# Patient Record
Sex: FEMALE | Race: WHITE | NOT HISPANIC OR LATINO | Employment: UNEMPLOYED | URBAN - METROPOLITAN AREA
[De-identification: names, ages, dates, MRNs, and addresses within clinical notes are randomized per-mention and may not be internally consistent; named-entity substitution may affect disease eponyms.]

---

## 2017-04-28 DIAGNOSIS — I51.7 CARDIOMEGALY: ICD-10-CM

## 2017-04-28 DIAGNOSIS — R42 DIZZINESS AND GIDDINESS: ICD-10-CM

## 2017-05-03 ENCOUNTER — APPOINTMENT (OUTPATIENT)
Dept: LAB | Facility: CLINIC | Age: 47
End: 2017-05-03
Payer: COMMERCIAL

## 2017-05-03 ENCOUNTER — HOSPITAL ENCOUNTER (OUTPATIENT)
Dept: NON INVASIVE DIAGNOSTICS | Facility: CLINIC | Age: 47
Discharge: HOME | End: 2017-05-03
Payer: COMMERCIAL

## 2017-05-03 DIAGNOSIS — R42 DIZZINESS AND GIDDINESS: ICD-10-CM

## 2017-05-03 DIAGNOSIS — I51.7 CARDIOMEGALY: ICD-10-CM

## 2017-05-03 LAB
ALBUMIN SERPL BCP-MCNC: 3.4 G/DL (ref 3.5–5)
ALP SERPL-CCNC: 53 U/L (ref 46–116)
ALT SERPL W P-5'-P-CCNC: 31 U/L (ref 12–78)
ANION GAP SERPL CALCULATED.3IONS-SCNC: 10 MMOL/L (ref 4–13)
AST SERPL W P-5'-P-CCNC: 26 U/L (ref 5–45)
BASOPHILS # BLD AUTO: 0.06 THOUSANDS/ΜL (ref 0–0.1)
BASOPHILS NFR BLD AUTO: 1 % (ref 0–1)
BILIRUB SERPL-MCNC: 0.4 MG/DL (ref 0.2–1)
BUN SERPL-MCNC: 23 MG/DL (ref 5–25)
CALCIUM SERPL-MCNC: 8.5 MG/DL (ref 8.3–10.1)
CHLORIDE SERPL-SCNC: 108 MMOL/L (ref 100–108)
CO2 SERPL-SCNC: 24 MMOL/L (ref 21–32)
CREAT SERPL-MCNC: 0.92 MG/DL (ref 0.6–1.3)
EOSINOPHIL # BLD AUTO: 0.17 THOUSAND/ΜL (ref 0–0.61)
EOSINOPHIL NFR BLD AUTO: 3 % (ref 0–6)
ERYTHROCYTE [DISTWIDTH] IN BLOOD BY AUTOMATED COUNT: 13.2 % (ref 11.6–15.1)
GFR SERPL CREATININE-BSD FRML MDRD: >60 ML/MIN/1.73SQ M
GLUCOSE P FAST SERPL-MCNC: 75 MG/DL (ref 65–99)
HCT VFR BLD AUTO: 40.9 % (ref 34.8–46.1)
HGB BLD-MCNC: 13.5 G/DL (ref 11.5–15.4)
LYMPHOCYTES # BLD AUTO: 2.01 THOUSANDS/ΜL (ref 0.6–4.47)
LYMPHOCYTES NFR BLD AUTO: 32 % (ref 14–44)
MCH RBC QN AUTO: 30.8 PG (ref 26.8–34.3)
MCHC RBC AUTO-ENTMCNC: 33 G/DL (ref 31.4–37.4)
MCV RBC AUTO: 93 FL (ref 82–98)
MONOCYTES # BLD AUTO: 0.49 THOUSAND/ΜL (ref 0.17–1.22)
MONOCYTES NFR BLD AUTO: 8 % (ref 4–12)
NEUTROPHILS # BLD AUTO: 3.62 THOUSANDS/ΜL (ref 1.85–7.62)
NEUTS SEG NFR BLD AUTO: 56 % (ref 43–75)
PLATELET # BLD AUTO: 189 THOUSANDS/UL (ref 149–390)
PMV BLD AUTO: 12.5 FL (ref 8.9–12.7)
POTASSIUM SERPL-SCNC: 4.2 MMOL/L (ref 3.5–5.3)
PROT SERPL-MCNC: 6.7 G/DL (ref 6.4–8.2)
RBC # BLD AUTO: 4.39 MILLION/UL (ref 3.81–5.12)
SODIUM SERPL-SCNC: 142 MMOL/L (ref 136–145)
T3 SERPL-MCNC: 0.8 NG/ML (ref 0.6–1.8)
T4 FREE SERPL-MCNC: 1.02 NG/DL (ref 0.76–1.46)
TSH SERPL DL<=0.05 MIU/L-ACNC: 4.68 UIU/ML (ref 0.36–3.74)
WBC # BLD AUTO: 6.35 THOUSAND/UL (ref 4.31–10.16)

## 2017-05-03 PROCEDURE — 84443 ASSAY THYROID STIM HORMONE: CPT

## 2017-05-03 PROCEDURE — 85025 COMPLETE CBC W/AUTO DIFF WBC: CPT

## 2017-05-03 PROCEDURE — 84439 ASSAY OF FREE THYROXINE: CPT

## 2017-05-03 PROCEDURE — 80053 COMPREHEN METABOLIC PANEL: CPT

## 2017-05-03 PROCEDURE — 93306 TTE W/DOPPLER COMPLETE: CPT

## 2017-05-03 PROCEDURE — 84480 ASSAY TRIIODOTHYRONINE (T3): CPT

## 2017-05-03 PROCEDURE — 36415 COLL VENOUS BLD VENIPUNCTURE: CPT

## 2017-07-28 ENCOUNTER — TRANSCRIBE ORDERS (OUTPATIENT)
Dept: ADMINISTRATIVE | Facility: HOSPITAL | Age: 47
End: 2017-07-28

## 2017-07-28 DIAGNOSIS — R09.89 OTHER DYSPNEA AND RESPIRATORY ABNORMALITY: Primary | ICD-10-CM

## 2017-07-28 DIAGNOSIS — R55 SYNCOPE AND COLLAPSE: ICD-10-CM

## 2017-07-28 DIAGNOSIS — M79.10 MYALGIA: ICD-10-CM

## 2017-07-28 DIAGNOSIS — R89.4 ABNORMAL IMMUNOLOGICAL FINDINGS IN SPECIMENS FROM OTHER ORGANS, SYSTEMS AND TISSUES: ICD-10-CM

## 2017-07-28 DIAGNOSIS — R06.02 SHORTNESS OF BREATH: ICD-10-CM

## 2017-07-28 DIAGNOSIS — R79.89 OTHER SPECIFIED ABNORMAL FINDINGS OF BLOOD CHEMISTRY: ICD-10-CM

## 2017-07-28 DIAGNOSIS — R06.09 OTHER FORMS OF DYSPNEA: ICD-10-CM

## 2017-07-28 DIAGNOSIS — R42 DIZZINESS AND GIDDINESS: ICD-10-CM

## 2017-07-28 DIAGNOSIS — R53.83 OTHER FATIGUE: ICD-10-CM

## 2017-07-28 DIAGNOSIS — R06.09 OTHER DYSPNEA AND RESPIRATORY ABNORMALITY: Primary | ICD-10-CM

## 2017-07-31 ENCOUNTER — TRANSCRIBE ORDERS (OUTPATIENT)
Dept: LAB | Facility: CLINIC | Age: 47
End: 2017-07-31

## 2017-07-31 DIAGNOSIS — R53.83 FATIGUE, UNSPECIFIED TYPE: Primary | ICD-10-CM

## 2017-08-01 ENCOUNTER — GENERIC CONVERSION - ENCOUNTER (OUTPATIENT)
Dept: OTHER | Facility: OTHER | Age: 47
End: 2017-08-01

## 2017-08-01 ENCOUNTER — APPOINTMENT (OUTPATIENT)
Dept: LAB | Facility: CLINIC | Age: 47
End: 2017-08-01
Payer: COMMERCIAL

## 2017-08-01 ENCOUNTER — HOSPITAL ENCOUNTER (OUTPATIENT)
Dept: NON INVASIVE DIAGNOSTICS | Facility: CLINIC | Age: 47
Discharge: HOME | End: 2017-08-01
Attending: INTERNAL MEDICINE
Payer: COMMERCIAL

## 2017-08-01 ENCOUNTER — ALLSCRIPTS OFFICE VISIT (OUTPATIENT)
Dept: OTHER | Facility: OTHER | Age: 47
End: 2017-08-01

## 2017-08-01 DIAGNOSIS — M79.10 MYALGIA: ICD-10-CM

## 2017-08-01 DIAGNOSIS — R53.83 OTHER FATIGUE: ICD-10-CM

## 2017-08-01 DIAGNOSIS — R06.09 OTHER FORMS OF DYSPNEA: ICD-10-CM

## 2017-08-01 DIAGNOSIS — R53.83 FATIGUE, UNSPECIFIED TYPE: ICD-10-CM

## 2017-08-01 LAB
25(OH)D3 SERPL-MCNC: 22.9 NG/ML (ref 30–100)
ALBUMIN SERPL BCP-MCNC: 3.5 G/DL (ref 3.5–5)
ALP SERPL-CCNC: 65 U/L (ref 46–116)
ALT SERPL W P-5'-P-CCNC: 22 U/L (ref 12–78)
ANION GAP SERPL CALCULATED.3IONS-SCNC: 7 MMOL/L (ref 4–13)
AST SERPL W P-5'-P-CCNC: 20 U/L (ref 5–45)
BACTERIA UR QL AUTO: ABNORMAL /HPF
BASOPHILS # BLD AUTO: 0.03 THOUSANDS/ΜL (ref 0–0.1)
BASOPHILS NFR BLD AUTO: 1 % (ref 0–1)
BILIRUB SERPL-MCNC: 0.5 MG/DL (ref 0.2–1)
BILIRUB UR QL STRIP: NEGATIVE
BUN SERPL-MCNC: 13 MG/DL (ref 5–25)
CALCIUM SERPL-MCNC: 8.9 MG/DL (ref 8.3–10.1)
CHEST PAIN STATEMENT: NORMAL
CHLORIDE SERPL-SCNC: 103 MMOL/L (ref 100–108)
CLARITY UR: CLEAR
CO2 SERPL-SCNC: 28 MMOL/L (ref 21–32)
COLOR UR: YELLOW
CREAT SERPL-MCNC: 0.86 MG/DL (ref 0.6–1.3)
CRP SERPL QL: <3 MG/L
EOSINOPHIL # BLD AUTO: 0.1 THOUSAND/ΜL (ref 0–0.61)
EOSINOPHIL NFR BLD AUTO: 2 % (ref 0–6)
ERYTHROCYTE [DISTWIDTH] IN BLOOD BY AUTOMATED COUNT: 13.7 % (ref 11.6–15.1)
ERYTHROCYTE [SEDIMENTATION RATE] IN BLOOD: 5 MM/HOUR (ref 0–20)
GFR SERPL CREATININE-BSD FRML MDRD: 81 ML/MIN/1.73SQ M
GLUCOSE SERPL-MCNC: 83 MG/DL (ref 65–140)
GLUCOSE UR STRIP-MCNC: NEGATIVE MG/DL
HCT VFR BLD AUTO: 41.8 % (ref 34.8–46.1)
HGB BLD-MCNC: 13.6 G/DL (ref 11.5–15.4)
HGB UR QL STRIP.AUTO: NEGATIVE
KETONES UR STRIP-MCNC: NEGATIVE MG/DL
LEUKOCYTE ESTERASE UR QL STRIP: ABNORMAL
LYMPHOCYTES # BLD AUTO: 1.59 THOUSANDS/ΜL (ref 0.6–4.47)
LYMPHOCYTES NFR BLD AUTO: 28 % (ref 14–44)
MAGNESIUM SERPL-MCNC: 1.9 MG/DL (ref 1.6–2.6)
MAX DIASTOLIC BP: 80 MMHG
MAX HEART RATE: 171 BPM
MAX PREDICTED HEART RATE: 173 BPM
MAX. SYSTOLIC BP: 164 MMHG
MCH RBC QN AUTO: 30.4 PG (ref 26.8–34.3)
MCHC RBC AUTO-ENTMCNC: 32.5 G/DL (ref 31.4–37.4)
MCV RBC AUTO: 94 FL (ref 82–98)
MONOCYTES # BLD AUTO: 0.47 THOUSAND/ΜL (ref 0.17–1.22)
MONOCYTES NFR BLD AUTO: 8 % (ref 4–12)
NEUTROPHILS # BLD AUTO: 3.58 THOUSANDS/ΜL (ref 1.85–7.62)
NEUTS SEG NFR BLD AUTO: 61 % (ref 43–75)
NITRITE UR QL STRIP: NEGATIVE
NON-SQ EPI CELLS URNS QL MICRO: ABNORMAL /HPF
PH UR STRIP.AUTO: 7.5 [PH] (ref 4.5–8)
PLATELET # BLD AUTO: 211 THOUSANDS/UL (ref 149–390)
PMV BLD AUTO: 11.8 FL (ref 8.9–12.7)
POTASSIUM SERPL-SCNC: 4.4 MMOL/L (ref 3.5–5.3)
PROT SERPL-MCNC: 6.8 G/DL (ref 6.4–8.2)
PROT UR STRIP-MCNC: NEGATIVE MG/DL
PROTOCOL NAME: NORMAL
RBC # BLD AUTO: 4.47 MILLION/UL (ref 3.81–5.12)
RBC #/AREA URNS AUTO: ABNORMAL /HPF
REASON FOR TERMINATION: NORMAL
SODIUM SERPL-SCNC: 138 MMOL/L (ref 136–145)
SP GR UR STRIP.AUTO: <=1.005 (ref 1–1.03)
T4 FREE SERPL-MCNC: 1.03 NG/DL (ref 0.76–1.46)
TARGET HR FORMULA: NORMAL
TEST INDICATION: NORMAL
TIME IN EXERCISE PHASE: 1140 S
TSH SERPL DL<=0.05 MIU/L-ACNC: 3.03 UIU/ML (ref 0.36–3.74)
UROBILINOGEN UR QL STRIP.AUTO: 0.2 E.U./DL
WBC # BLD AUTO: 5.77 THOUSAND/UL (ref 4.31–10.16)
WBC #/AREA URNS AUTO: ABNORMAL /HPF

## 2017-08-01 PROCEDURE — 84439 ASSAY OF FREE THYROXINE: CPT

## 2017-08-01 PROCEDURE — 80053 COMPREHEN METABOLIC PANEL: CPT

## 2017-08-01 PROCEDURE — 86140 C-REACTIVE PROTEIN: CPT

## 2017-08-01 PROCEDURE — 86618 LYME DISEASE ANTIBODY: CPT

## 2017-08-01 PROCEDURE — 83735 ASSAY OF MAGNESIUM: CPT

## 2017-08-01 PROCEDURE — 86664 EPSTEIN-BARR NUCLEAR ANTIGEN: CPT

## 2017-08-01 PROCEDURE — 82306 VITAMIN D 25 HYDROXY: CPT

## 2017-08-01 PROCEDURE — 86663 EPSTEIN-BARR ANTIBODY: CPT

## 2017-08-01 PROCEDURE — 36415 COLL VENOUS BLD VENIPUNCTURE: CPT

## 2017-08-01 PROCEDURE — 85025 COMPLETE CBC W/AUTO DIFF WBC: CPT

## 2017-08-01 PROCEDURE — 84443 ASSAY THYROID STIM HORMONE: CPT

## 2017-08-01 PROCEDURE — 85652 RBC SED RATE AUTOMATED: CPT

## 2017-08-01 PROCEDURE — 86665 EPSTEIN-BARR CAPSID VCA: CPT

## 2017-08-01 PROCEDURE — 93017 CV STRESS TEST TRACING ONLY: CPT

## 2017-08-01 PROCEDURE — 81001 URINALYSIS AUTO W/SCOPE: CPT

## 2017-08-02 LAB
B BURGDOR IGG SER IA-ACNC: 0.09
B BURGDOR IGM SER IA-ACNC: 0.17

## 2017-08-04 LAB
EBV EA IGG SER-ACNC: 21.2 U/ML (ref 0–8.9)
EBV NA IGG SER IA-ACNC: 146 U/ML (ref 0–17.9)
EBV PATRN SPEC IB-IMP: ABNORMAL
EBV VCA IGG SER IA-ACNC: 228 U/ML (ref 0–17.9)
EBV VCA IGM SER IA-ACNC: >160 U/ML (ref 0–35.9)

## 2017-08-08 ENCOUNTER — HOSPITAL ENCOUNTER (OUTPATIENT)
Dept: RADIOLOGY | Facility: HOSPITAL | Age: 47
Discharge: HOME | End: 2017-08-08
Payer: COMMERCIAL

## 2017-08-08 ENCOUNTER — TRANSCRIBE ORDERS (OUTPATIENT)
Dept: ADMINISTRATIVE | Facility: HOSPITAL | Age: 47
End: 2017-08-08

## 2017-08-08 ENCOUNTER — GENERIC CONVERSION - ENCOUNTER (OUTPATIENT)
Dept: OTHER | Facility: OTHER | Age: 47
End: 2017-08-08

## 2017-08-08 ENCOUNTER — APPOINTMENT (OUTPATIENT)
Dept: LAB | Facility: CLINIC | Age: 47
End: 2017-08-08
Payer: COMMERCIAL

## 2017-08-08 DIAGNOSIS — R89.4 ABNORMAL IMMUNOLOGICAL FINDINGS IN SPECIMENS FROM OTHER ORGANS, SYSTEMS AND TISSUES: ICD-10-CM

## 2017-08-08 DIAGNOSIS — M79.10 MYALGIA: ICD-10-CM

## 2017-08-08 DIAGNOSIS — R06.02 SHORTNESS OF BREATH: ICD-10-CM

## 2017-08-08 DIAGNOSIS — R53.83 OTHER FATIGUE: ICD-10-CM

## 2017-08-08 LAB — DEPRECATED D DIMER PPP: 8958 NG/ML (FEU) (ref 0–424)

## 2017-08-08 PROCEDURE — 71020 HB CHEST X-RAY 2VW FRONTAL&LATL: CPT

## 2017-08-08 PROCEDURE — 85379 FIBRIN DEGRADATION QUANT: CPT

## 2017-08-08 PROCEDURE — 86747 PARVOVIRUS ANTIBODY: CPT

## 2017-08-08 PROCEDURE — 36415 COLL VENOUS BLD VENIPUNCTURE: CPT

## 2017-08-08 PROCEDURE — 86665 EPSTEIN-BARR CAPSID VCA: CPT

## 2017-08-08 PROCEDURE — 86664 EPSTEIN-BARR NUCLEAR ANTIGEN: CPT

## 2017-08-08 PROCEDURE — 86644 CMV ANTIBODY: CPT

## 2017-08-08 PROCEDURE — 86645 CMV ANTIBODY IGM: CPT

## 2017-08-08 PROCEDURE — 86663 EPSTEIN-BARR ANTIBODY: CPT

## 2017-08-09 ENCOUNTER — HOSPITAL ENCOUNTER (OUTPATIENT)
Dept: RADIOLOGY | Facility: HOSPITAL | Age: 47
Discharge: HOME | End: 2017-08-09
Payer: COMMERCIAL

## 2017-08-09 ENCOUNTER — HOSPITAL ENCOUNTER (OUTPATIENT)
Dept: RADIOLOGY | Facility: HOSPITAL | Age: 47
End: 2017-08-09
Payer: COMMERCIAL

## 2017-08-09 ENCOUNTER — TRANSCRIBE ORDERS (OUTPATIENT)
Dept: ADMINISTRATIVE | Facility: HOSPITAL | Age: 47
End: 2017-08-09

## 2017-08-09 DIAGNOSIS — R79.89 OTHER ABNORMAL BLOOD CHEMISTRY: Primary | ICD-10-CM

## 2017-08-09 DIAGNOSIS — R06.02 SHORTNESS OF BREATH: ICD-10-CM

## 2017-08-09 DIAGNOSIS — R79.89 OTHER ABNORMAL BLOOD CHEMISTRY: ICD-10-CM

## 2017-08-09 LAB
B19V IGG SER IA-ACNC: 5.1 INDEX (ref 0–0.8)
B19V IGM SER IA-ACNC: 0.1 INDEX (ref 0–0.8)
CMV IGG SERPL IA-ACNC: <0.6 U/ML (ref 0–0.59)
CMV IGM SERPL IA-ACNC: <30 AU/ML (ref 0–29.9)
EBV EA IGG SER-ACNC: 27.6 U/ML (ref 0–8.9)
EBV NA IGG SER IA-ACNC: 143 U/ML (ref 0–17.9)
EBV PATRN SPEC IB-IMP: ABNORMAL
EBV VCA IGG SER IA-ACNC: 223 U/ML (ref 0–17.9)
EBV VCA IGM SER IA-ACNC: >160 U/ML (ref 0–35.9)

## 2017-08-09 PROCEDURE — 71275 CT ANGIOGRAPHY CHEST: CPT

## 2017-08-09 PROCEDURE — 93970 EXTREMITY STUDY: CPT

## 2017-08-09 RX ADMIN — IOHEXOL 85 ML: 350 INJECTION, SOLUTION INTRAVENOUS at 13:20

## 2017-08-15 ENCOUNTER — ALLSCRIPTS OFFICE VISIT (OUTPATIENT)
Dept: OTHER | Facility: OTHER | Age: 47
End: 2017-08-15

## 2017-11-28 DIAGNOSIS — R53.83 OTHER FATIGUE: ICD-10-CM

## 2017-11-28 DIAGNOSIS — R89.4 ABNORMAL IMMUNOLOGICAL FINDINGS IN SPECIMENS FROM OTHER ORGANS, SYSTEMS AND TISSUES: ICD-10-CM

## 2017-11-28 DIAGNOSIS — R79.89 OTHER SPECIFIED ABNORMAL FINDINGS OF BLOOD CHEMISTRY: ICD-10-CM

## 2017-12-18 ENCOUNTER — ALLSCRIPTS OFFICE VISIT (OUTPATIENT)
Dept: OTHER | Facility: OTHER | Age: 47
End: 2017-12-18

## 2018-01-11 NOTE — MISCELLANEOUS
Message  I spoke with patient about her lab work  She has positive Shaheen-Lizarraga titers  I did speak with Dr Alejandra Calhoun of Infectious Disease who feels this could possibly be cross reactive antibodies to multiple viral illnesses including CMV  This could also be positive in cases when a patient has early rheumatoid arthritis and we could consider CMV antibodies, RF and ZACH as the next set of labs  I spoke with the patient extensively last night reviewing all of her lab work  She does remain fatigued and somewhat short of breath  She is still able to run 6-7 miles at a time but feels more short of breath when she is resting or occasionally in the middle the night  I am going to have her get a chest x-ray at her request       Plan  Shortness of breath    · * XR CHEST PA & LATERAL; Status:Active;  Requested for:[id number];     Signatures   Electronically signed by : CHRISTA Oro ; Aug  8 2017 10:57AM EST                       (Author)

## 2018-01-11 NOTE — MISCELLANEOUS
Message  I received a call from the patient's , Dr Adan Speaker, regarding this patient not feeling well  She has been having some persistent fatigue and arthralgias  She will be establishing as a patient in my practice, so I agreed to order some initial lab work on her  We will set het up for a visit in our practice  Plan  Fatigue    · (1) CBC/PLT/DIFF; Status:Active; Requested for:01Aug2017;    · (1) COMPREHENSIVE METABOLIC PANEL; Status:Active; Requested for:01Aug2017;    · (1) C-REACTIVE PROTEIN; Status:Active; Requested for:01Aug2017;    · (1) DELORES BARR VIRUS; Status:Active; Requested for:01Aug2017;    · (1) LYME ANTIBODY PROFILE W/REFLEX TO WESTERN BLOT; Status:Active; Requested for:01Aug2017;    · (1) MAGNESIUM; Status:Active; Requested for:01Aug2017;    · (1) SED RATE; Status:Active; Requested for:01Aug2017;    · (1) T4, FREE; Status:Active; Requested for:01Aug2017;    · (1) TSH; Status:Active; Requested for:01Aug2017;    · (1) URINALYSIS (will reflex a microscopy if leukocytes, occult blood, protein or nitrites  are not within normal limits); Status:Active; Requested for:01Aug2017;   Myalgia    · (1) VITAMIN D 25-HYDROXY; Status:Active;  Requested for:01Aug2017;     Signatures   Electronically signed by : CHRITSA Marcano ; Aug  1 2017  9:40AM EST                       (Author)

## 2018-01-14 VITALS
DIASTOLIC BLOOD PRESSURE: 84 MMHG | SYSTOLIC BLOOD PRESSURE: 106 MMHG | BODY MASS INDEX: 19.3 KG/M2 | HEART RATE: 95 BPM | WEIGHT: 123 LBS | HEIGHT: 67 IN

## 2018-01-14 VITALS — SYSTOLIC BLOOD PRESSURE: 90 MMHG | DIASTOLIC BLOOD PRESSURE: 76 MMHG

## 2018-01-16 NOTE — RESULT NOTES
Verified Results  (1) D-DIMER 08Aug2017 04:03PM Anil Ritter Order Number: [id number]     Test Name Result Flag Reference   D-DIMER 8958 ng/ml (FEU) H 0-424   Reference and upper limits to exclude DVT and PE are the same  Do not use to exclude if clinical symptoms are present  Pregnant women:  1st trimester:  <220 - 1060 ng/ml (FEU)  2nd trimester:  <220 - 1880 ng/ml (FEU)  3rd trimester:   238 - 3280 ng/ml (FEU)     * XR CHEST PA & LATERAL 08Aug2017 03:40PM Raadmary anneanthony Pollyakhil Rdz Order Number: [id number]     Test Name Result Flag Reference   XR CHEST PA & LATERAL (Report)     CHEST      INDICATION: Shortness of breath  COMPARISON: None     VIEWS: Frontal and lateral projections     IMAGES: 2     FINDINGS:        Cardiomediastinal silhouette appears unremarkable  The lungs are clear  No pneumothorax or pleural effusion  Visualized osseous structures appear within normal limits for the patient's age  IMPRESSION:     No active pulmonary disease  Workstation performed: [id number]     Signed by:   Phu Novak MD   8/8/17     (1) LYME ANTIBODY PROFILE W/REFLEX TO WESTERN BLOT 01Aug2017 11:22AM Eder Rdz Order Number: [id number]     Test Name Result Flag Reference   LYME IGG 0 09  0 00-0 79   NEGATIVE(0 00-0 79)-Absence of detectable Borrelia IgG Antibodies  A negative result does not exclude the possibility of Borrelia infection  If early Lyme disease is suspected,a second sample should be collected & tested 4 weeks after initial testing  LYME IGM 0 17  0 00-0 79   NEGATIVE (0 00-0 79)-Absence of detectable Borrelia IgM antibodies  A negative result does not exclude the possibility of Borrelia infection   If early lyme disease is suspected, a second sample should be collected & tested 4 weeks after initial testing      (1) CBC/PLT/DIFF 01Aug2017 11:22AM Sidonie Reading     Test Name Result Flag Reference   WBC COUNT 5 77 Thousand/uL 4 31-10 16   RBC COUNT 4 47 Million/uL  3 81-5 12   HEMOGLOBIN 13 6 g/dL  11 5-15 4   HEMATOCRIT 41 8 %  34 8-46  1   MCV 94 fL  82-98   MCH 30 4 pg  26 8-34 3   MCHC 32 5 g/dL  31 4-37 4   RDW 13 7 %  11 6-15 1   MPV 11 8 fL  8 9-12 7   PLATELET COUNT 916 Thousands/uL  149-390   NEUTROPHILS RELATIVE PERCENT 61 %  43-75   LYMPHOCYTES RELATIVE PERCENT 28 %  14-44   MONOCYTES RELATIVE PERCENT 8 %  4-12   EOSINOPHILS RELATIVE PERCENT 2 %  0-6   BASOPHILS RELATIVE PERCENT 1 %  0-1   NEUTROPHILS ABSOLUTE COUNT 3 58 Thousands/? ??L  1 85-7 62   LYMPHOCYTES ABSOLUTE COUNT 1 59 Thousands/? ??L  0 60-4 47   MONOCYTES ABSOLUTE COUNT 0 47 Thousand/? ??L  0 17-1 22   EOSINOPHILS ABSOLUTE COUNT 0 10 Thousand/? ??L  0 00-0 61   BASOPHILS ABSOLUTE COUNT 0 03 Thousands/? ??L  0 00-0 10     (1) COMPREHENSIVE METABOLIC PANEL [id number] 60:51NS [place] Jess Almanzar     Test Name Result Flag Reference   GLUCOSE,RANDM 83 mg/dL     If the patient is fasting, the ADA then defines impaired fasting glucose as > 100 mg/dL and diabetes as > or equal to 123 mg/dL  SODIUM 138 mmol/L  136-145   POTASSIUM 4 4 mmol/L  3 5-5 3   CHLORIDE 103 mmol/L  100-108   CARBON DIOXIDE 28 mmol/L  21-32   ANION GAP (CALC) 7 mmol/L  4-13   BLOOD UREA NITROGEN 13 mg/dL  5-25   CREATININE 0 86 mg/dL  0 60-1 30   Standardized to IDMS reference method   CALCIUM 8 9 mg/dL  8 3-10 1   BILI, TOTAL 0 50 mg/dL  0 20-1 00   ALK PHOSPHATAS 65 U/L     ALT (SGPT) 22 U/L  12-78   AST(SGOT) 20 U/L  5-45   ALBUMIN 3 5 g/dL  3 5-5 0   TOTAL PROTEIN 6 8 g/dL  6 4-8 2   eGFR 81 ml/min/1 73sq m     National Kidney Disease Education Program recommendations are as follows:  GFR calculation is accurate only with a steady state creatinine  Chronic Kidney disease less than 60 ml/min/1 73 sq  meters  Kidney failure less than 15 ml/min/1 73 sq  meters       (1) TSH [id number] 11:22AM Stephani Edwardo     Test Name Result Flag Reference   TSH 3 028 uIU/mL  0 358-3 740   Patients undergoing fluorescein dye angiography may retain small amounts of fluorescein in the body for 48-72 hours post procedure  Samples containing fluorescein can produce falsely depressed TSH values  If the patient had this procedure,a specimen should be resubmitted post fluorescein clearance  The recommended reference ranges for TSH during pregnancy are as follows:  First trimester 0 1 to 2 5 uIU/mL  Second trimester  0 2 to 3 0 uIU/mL  Third trimester 0 3 to 3 0 uIU/m     (1) T4, FREE 01Aug2017 11:22AM Nestora Milks     Test Name Result Flag Reference   T4,FREE 1 03 ng/dL  0 76-1 46     (1) Joy Bishop VIRUS 01Aug2017 11:22AM Nestora Milks   TW Order Number: [id number]     Test Name Result Flag Reference   EBV EARLY ANTIGEN AB, IGG 21 2 U/mL H 0 0 - 8 9   Hepatitis A, Hepatitis C and HIV antibodies may cross-react  with this assay    **Verified by repeat analysis**                                   Negative        < 9 0                                   Equivocal  9 0 - 10 9                                   Positive        >10 9   DELORES-BARR VCA  0 U/mL H 0 0 - 17 9   Negative        <18 0                                   Equivocal 18 0 - 21 9                                   Positive        >21 9   DELORES-BARR VCA IGM >160 0 U/mL H 0 0 - 35 9   Negative        <36 0                                   Equivocal 36 0 - 43 9                                   Positive        >43 9   DELORES-COVARRUBIAS NUCLEAR ANTIGEN AB  0 U/mL H 0 0 - 17 9   Negative        <18 0                                   Equivocal 18 0 - 21 9                                   Positive        >21 9   EBV INTERPRETATION Comment     EBV Interpretation Chart  Interpretation   EBV-IgM  EA(D)-IgG  VCA-IgG  EBNA-IgG  EBV Seronegative    -        -         -          -  Early Phase         +        -         -          -  Acute Primary       +       +or-       +          -  Infection  Convalescence/Past  - +or-       +          +  Infection  Reactivated        +or-      +         +          +  Infection         + Antibody Present      - Antibody Absent    Performed at:  [address]  [id number]  : Alex Rossi MD, Phone:  [phone]     (1) MAGNESIUM 01Aug2017 11:22AM [place] Arvid Bence     Test Name Result Flag Reference   MAGNESIUM 1 9 mg/dL  1 6-2 6     (1) URINALYSIS (will reflex a microscopy if leukocytes, occult blood, protein or nitrites are not within normal limits) 01Aug2017 11:22AM Jessi Cox     Test Name Result Flag Reference   COLOR Yellow     CLARITY Clear     SPECIFIC GRAVITY UA <=1 005  1 003-1 030   PH UA 7 5  4 5-8 0   LEUKOCYTE ESTERASE UA Trace A Negative   NITRITE UA Negative  Negative   PROTEIN UA Negative mg/dl  Negative   GLUCOSE UA Negative mg/dl  Negative   KETONES UA Negative mg/dl  Negative   UROBILINOGEN UA 0 2 E U /dl  0 2, 1 0 E U /dl   BILIRUBIN UA Negative  Negative   BLOOD UA Negative  Negative   BACTERIA Occasional /hpf  None Seen, Occasional   EPITHELIAL CELLS Occasional /hpf  None Seen, Occasional   RBC UA 0-1 /hpf A None Seen   WBC UA 0-1 /hpf A None Seen     (1) VITAMIN D 25-HYDROXY 01Aug2017 11:22AM Jessi Cox     Test Name Result Flag Reference   VIT D 25-HYDROX 22 9 ng/mL L 30 0-100 0   This assay is a certified procedure of the CDC Vitamin D Standardization Certification Program (VDSCP)     Deficiency <20ng/ml   Insufficiency 20-30ng/ml   Sufficient  ng/ml     *Patients undergoing fluorescein dye angiography may retain small amounts of fluorescein in the body for 48-72 hours post procedure  Samples containing fluorescein can produce falsely elevated Vitamin D values  If the patient had this procedure, a specimen should be resubmitted post fluorescein clearance

## 2018-02-26 ENCOUNTER — TELEPHONE (OUTPATIENT)
Dept: FAMILY MEDICINE CLINIC | Facility: CLINIC | Age: 48
End: 2018-02-26

## 2018-02-26 NOTE — TELEPHONE ENCOUNTER
She called and LM that she never got her lab results that were drawn at [facility] in [place] at beginning of Feb    Results are not in her chart  I called [employer] and spoke w Almita Weathers who is faxing us results asap

## 2018-07-26 ENCOUNTER — HOSPITAL ENCOUNTER (OUTPATIENT)
Dept: MAMMOGRAPHY | Facility: CLINIC | Age: 48
Discharge: HOME | End: 2018-07-26
Payer: COMMERCIAL

## 2018-07-26 ENCOUNTER — HOSPITAL ENCOUNTER (OUTPATIENT)
Dept: ULTRASOUND IMAGING | Facility: CLINIC | Age: 48
Discharge: HOME | End: 2018-07-26
Payer: COMMERCIAL

## 2018-07-26 DIAGNOSIS — N63.0 LUMP OR MASS IN BREAST: ICD-10-CM

## 2018-07-26 PROCEDURE — 77066 DX MAMMO INCL CAD BI: CPT

## 2018-07-26 PROCEDURE — G0279 TOMOSYNTHESIS, MAMMO: HCPCS

## 2018-09-05 ENCOUNTER — VBI (OUTPATIENT)
Dept: ADMINISTRATIVE | Facility: OTHER | Age: 48
End: 2018-09-05

## 2018-09-11 NOTE — TELEPHONE ENCOUNTER
Claire Record    ED Visit Information     Ed visit date:8/21/18  Diagnosis Description: PALPITATIONS  In Network? No  Discharge status: Home  Discharged with meds ? NA  Number of ED visits to date: 1  ED Severity:5     Outreach Information    Outreach successful: Yes 1  Date letter mailed:0  Date Finalized:9/11/18    Care Coordination    Follow up appointment with pcp: no declined  Transportation issues ? No    [employer] type:  3 Day Outreach  Patient refsued the answer questions:  Yes  [facility] PCP:  Yes  Transportation:  Self Transport  Called PCP first?:  No  Practice Contacted Patient ?:  No  Pt had ED follow up with pcp/staff ?:  No    Reason Patient went to ED instead of Urgent Care or PCP?:  Proximity  Urgent care Education?:  No  Pt called me back stating she is doing fine feels better and doesn't feel she needs f/u   went to hospital due to proximity  Pt ended conversation no further information obtained

## 2019-03-22 ENCOUNTER — OFFICE VISIT (OUTPATIENT)
Dept: OBGYN CLINIC | Facility: CLINIC | Age: 49
End: 2019-03-22
Payer: COMMERCIAL

## 2019-03-22 VITALS
DIASTOLIC BLOOD PRESSURE: 73 MMHG | HEIGHT: 66 IN | HEART RATE: 61 BPM | BODY MASS INDEX: 19.85 KG/M2 | SYSTOLIC BLOOD PRESSURE: 110 MMHG

## 2019-03-22 DIAGNOSIS — M84.363A STRESS FRACTURE OF RIGHT FIBULA, INITIAL ENCOUNTER: Primary | ICD-10-CM

## 2019-03-22 DIAGNOSIS — D16.9 ENCHONDROMA OF BONE: ICD-10-CM

## 2019-03-22 PROCEDURE — 99203 OFFICE O/P NEW LOW 30 MIN: CPT | Performed by: ORTHOPAEDIC SURGERY

## 2019-03-22 NOTE — PROGRESS NOTES
CHRISTA Singh  Attending, Orthopaedic Surgery  [facility] [address] [facility]        ORTHOPAEDIC FOOT AND ANKLE CLINIC VISIT     Assessment:     Encounter Diagnoses   Name Primary? 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               Plan:   · The patient verbalized understanding of exam findings and treatment plan  We engaged in the shared decision-making process and treatment options were discussed at length with the patient  Surgical and conservative management discussed today along with risks and benefits  · Recommend non-weight bearing for 3 weeks in the boot  · Given crutches and recommended purchasing a knee rolling scooter  · Start taking Vitamin D and Calcium supplements OTC  · We will monitor the lesion in her 5th metatarsal with serial imaging in 6 months  Return in about 3 weeks (around 4/12/2019)  for reevaluation  If her pain is resolved at that time, we will allow her to gradually return to weight bearing and sneaker  History of Present Illness:   Chief Complaint:   Chief Complaint   Patient presents with   ASTRID/ Pito 9 is a 50 y o  female who is being seen for right ankle pain  She reports onset of pain occurred 4 weeks ago after running 6 miles on a treadmill  She is very active and typically runs 6 miles a day among other activities  Pain is localized just superior to the lateral malleolus with minimal radiating and described as sharp and severe  She feels that the pain is gradually improving  She has been weight bearing in a walking boot for the past 3 weeks  She has been avoiding running and all exercise for the past 3 weeks  Patient denies numbness, tingling or radicular pain  Denies history of neuropathy  Patient does not smoke, does not have diabetes and does not take blood thinners    Patient denies family history of anesthesia complications and has not had any complications with anesthesia  Pain/symptom timing:  Worse during the day when active  Pain/symptom context:  Worse with activites and work  Pain/symptom modifying factors:  Rest makes better, activities make worse  Pain/symptom associated signs/symptoms: none    Prior treatment   · NSAIDsNo    · Injections No   · Bracing/Orthotics Yes walking boot  · Physical Therapy No     Orthopedic Surgical History:   See below    Past Medical, Surgical and Social History:  Past Medical History:  has no past medical history on file  Problem List: does not have a problem list on file  Past Surgical History:  has no past surgical history on file  Family History: family history is not on file  Social History:  reports that she has never smoked  She has never used smokeless tobacco  Her alcohol and drug histories are not on file  Current Medications: currently has no medications in their medication list   Allergies: has No Known Allergies  Review of Systems:  General- denies fever/chills  HEENT- denies hearing loss or sore throat  Eyes- denies eye pain or visual disturbances, denies red eyes  Respiratory- denies cough or SOB  Cardio- denies chest pain or palpitations  GI- denies abdominal pain  Endocrine- denies urinary frequency  Urinary- denies pain with urination  Musculoskeletal- Negative except noted above  Skin- denies rashes or wounds  Neurological- denies dizziness or headache  Psychiatric- denies anxiety or difficulty concentrating    Physical Exam:   /73 (BP Location: Left arm, Patient Position: Sitting, Cuff Size: Adult)   Pulse 61   Ht 5' 6" (1 676 m)   BMI 19 85 kg/m²   General/Constitutional: No apparent distress: well-nourished and well developed    Eyes: normal ocular motion  Lymphatic: No appreciable lymphadenopathy  Respiratory: Non-labored breathing  Vascular: No edema, swelling or tenderness, except as noted in detailed exam   Integumentary: No impressive skin lesions present, except as noted in detailed exam   Neuro: No ataxia or tremors noted  Psych: Normal mood and affect, oriented to person, place and time  Appropriate affect  Musculoskeletal: Normal, except as noted in detailed exam and in HPI  Examination    Right    Gait Normal   Musculoskeletal Tender to palpation at distal fibula, just superior to the lateral malleolus    Skin Normal       Nails Normal    Range of Motion  25 degrees dorsiflexion, 50 degrees plantarflexion  Subtalar motion: normal    Stability Stable    Muscle Strength 5/5 tibialis anterior  5/5 gastrocnemius-soleus  5/5 posterior tibialis  5/5 peroneal/eversion strength  5/5 EHL  5/5 FHL    Neurologic Normal    Sensation Intact to light touch throughout sural, saphenous, superficial peroneal, deep peroneal and medial/lateral plantar nerve distributions  [place] 5 07 filament (10g) testing deferred  Cardiovascular Brisk capillary refill < 2 seconds,intact DP and PT pulses    Special Tests None      Imaging Studies:   MRI of the right ankle reviewed and interpreted independently that demonstrate a stress fracture with bone marrow edema of the distal fibula  Also noted on MRI is a 5th MT intramedullary bone lesion consistent with enchondroma or NOF    X-ray of the right ankle demonstrates stress reaction of the distal fibula and correlates with MRI  Scribe Attestation    I,:   Thang Chand PA-C am acting as a scribe while in the presence of the attending physician :        I,:   Dawna Estrada MD personally performed the services described in this documentation    as scribed in my presence :              Authur Distel Lachman, MD  Foot & Ankle Surgery   Department of [address] [place] [address]      I personally performed the service  Authur Distel Lachman, MD

## 2019-04-12 ENCOUNTER — OFFICE VISIT (OUTPATIENT)
Dept: OBGYN CLINIC | Facility: CLINIC | Age: 49
End: 2019-04-12
Payer: COMMERCIAL

## 2019-04-12 VITALS
HEART RATE: 72 BPM | HEIGHT: 66 IN | BODY MASS INDEX: 19.85 KG/M2 | SYSTOLIC BLOOD PRESSURE: 125 MMHG | DIASTOLIC BLOOD PRESSURE: 78 MMHG

## 2019-04-12 DIAGNOSIS — D16.9 ENCHONDROMA OF BONE: ICD-10-CM

## 2019-04-12 DIAGNOSIS — M84.363A STRESS FRACTURE OF RIGHT FIBULA, INITIAL ENCOUNTER: Primary | ICD-10-CM

## 2019-04-12 PROCEDURE — 99213 OFFICE O/P EST LOW 20 MIN: CPT | Performed by: ORTHOPAEDIC SURGERY

## 2019-05-10 ENCOUNTER — HOSPITAL ENCOUNTER (OUTPATIENT)
Dept: RADIOLOGY | Facility: HOSPITAL | Age: 49
Discharge: HOME | End: 2019-05-10
Attending: ORTHOPAEDIC SURGERY
Payer: COMMERCIAL

## 2019-05-10 ENCOUNTER — OFFICE VISIT (OUTPATIENT)
Dept: OBGYN CLINIC | Facility: HOSPITAL | Age: 49
End: 2019-05-10
Payer: COMMERCIAL

## 2019-05-10 VITALS
BODY MASS INDEX: 19.26 KG/M2 | HEIGHT: 67 IN | SYSTOLIC BLOOD PRESSURE: 121 MMHG | HEART RATE: 67 BPM | DIASTOLIC BLOOD PRESSURE: 80 MMHG

## 2019-05-10 DIAGNOSIS — D16.9 ENCHONDROMA OF BONE: ICD-10-CM

## 2019-05-10 DIAGNOSIS — M84.363S STRESS FRACTURE OF RIGHT FIBULA, SEQUELA: Primary | ICD-10-CM

## 2019-05-10 DIAGNOSIS — M84.363S STRESS FRACTURE OF RIGHT FIBULA, SEQUELA: ICD-10-CM

## 2019-05-10 PROCEDURE — 73590 X-RAY EXAM OF LOWER LEG: CPT

## 2019-05-10 PROCEDURE — 99213 OFFICE O/P EST LOW 20 MIN: CPT | Performed by: ORTHOPAEDIC SURGERY

## 2019-07-16 ENCOUNTER — TRANSCRIBE ORDERS (OUTPATIENT)
Dept: ADMINISTRATIVE | Facility: HOSPITAL | Age: 49
End: 2019-07-16

## 2019-07-16 DIAGNOSIS — Z12.31 VISIT FOR SCREENING MAMMOGRAM: Primary | ICD-10-CM

## 2019-07-16 DIAGNOSIS — Z12.39 BREAST SCREENING, UNSPECIFIED: Primary | ICD-10-CM

## 2019-07-16 DIAGNOSIS — N60.19 FIBROCYSTIC BREAST DISEASE (FCBD), UNSPECIFIED LATERALITY: ICD-10-CM

## 2019-09-12 ENCOUNTER — HOSPITAL ENCOUNTER (OUTPATIENT)
Dept: RADIOLOGY | Facility: HOSPITAL | Age: 49
Discharge: HOME | End: 2019-09-12
Payer: COMMERCIAL

## 2019-09-12 VITALS — WEIGHT: 120 LBS | HEIGHT: 66 IN | BODY MASS INDEX: 19.29 KG/M2

## 2019-09-12 DIAGNOSIS — N60.19 FIBROCYSTIC BREAST DISEASE (FCBD), UNSPECIFIED LATERALITY: ICD-10-CM

## 2019-09-12 DIAGNOSIS — Z12.39 BREAST SCREENING, UNSPECIFIED: ICD-10-CM

## 2019-09-12 PROCEDURE — 77063 BREAST TOMOSYNTHESIS BI: CPT

## 2019-09-12 PROCEDURE — 76641 ULTRASOUND BREAST COMPLETE: CPT

## 2019-09-12 PROCEDURE — 77067 SCR MAMMO BI INCL CAD: CPT

## 2019-09-12 PROCEDURE — 76377 3D RENDER W/INTRP POSTPROCES: CPT

## 2019-10-30 ENCOUNTER — OFFICE VISIT (OUTPATIENT)
Dept: FAMILY MEDICINE CLINIC | Facility: CLINIC | Age: 49
End: 2019-10-30
Payer: COMMERCIAL

## 2019-10-30 VITALS
HEIGHT: 66 IN | DIASTOLIC BLOOD PRESSURE: 64 MMHG | OXYGEN SATURATION: 99 % | BODY MASS INDEX: 19.37 KG/M2 | SYSTOLIC BLOOD PRESSURE: 108 MMHG | HEART RATE: 64 BPM | RESPIRATION RATE: 16 BRPM

## 2019-10-30 DIAGNOSIS — N92.0 MENORRHAGIA WITH REGULAR CYCLE: ICD-10-CM

## 2019-10-30 DIAGNOSIS — Z13.220 SCREENING FOR LIPID DISORDERS: ICD-10-CM

## 2019-10-30 DIAGNOSIS — Z00.00 ANNUAL PHYSICAL EXAM: Primary | ICD-10-CM

## 2019-10-30 DIAGNOSIS — Z12.11 COLON CANCER SCREENING: ICD-10-CM

## 2019-10-30 DIAGNOSIS — M84.363S STRESS FRACTURE OF RIGHT FIBULA, SEQUELA: ICD-10-CM

## 2019-10-30 DIAGNOSIS — Z23 FLU VACCINE NEED: ICD-10-CM

## 2019-10-30 PROBLEM — R76.8 EPSTEIN-BARR VIRUS SEROPOSITIVITY: Status: ACTIVE | Noted: 2017-08-08

## 2019-10-30 PROBLEM — R79.89 POSITIVE D-DIMER: Status: ACTIVE | Noted: 2017-08-09

## 2019-10-30 PROCEDURE — 99396 PREV VISIT EST AGE 40-64: CPT | Performed by: FAMILY MEDICINE

## 2019-10-30 PROCEDURE — 90471 IMMUNIZATION ADMIN: CPT

## 2019-10-30 PROCEDURE — 90686 IIV4 VACC NO PRSV 0.5 ML IM: CPT

## 2019-10-30 NOTE — PROGRESS NOTES
FAMILY PRACTICE OFFICE VISIT       NAME: Chilango Dawson  AGE: 52 y o  SEX: female       : 1970        MRN: [id number]    DATE: 2019  TIME: 3:33 PM    Assessment and Plan   BMI Counseling: Body mass index is 19 37 kg/m²  The BMI is above normal  Nutrition recommendations include encouraging healthy choices of fruits and vegetables  Exercise recommendations include moderate physical activity 150 minutes/week and exercising 3-5 times per week  BMI Counseling: Body mass index is 19 37 kg/m²  The BMI is below normal  Patient advised to gain weight  Problem List Items Addressed This Visit        Musculoskeletal and Integument    Stress fracture of right fibula     Check DEXA scan  Relevant Orders    DXA bone density spine hip and pelvis       Other    Menorrhagia with regular cycle     Check CBC with iron studies  Relevant Orders    CBC and differential    Iron, TIBC and Ferritin Panel    TSH, 3rd generation with Free T4 reflex    Screening for lipid disorders     Check fasting lipids  Relevant Orders    Comprehensive metabolic panel    Lipid Panel with Direct LDL reflex      Other Visit Diagnoses     Annual physical exam    -  Primary    Flu vaccine need        Relevant Orders    influenza vaccine, 4502-3824, quadrivalent, 0 5 mL, preservative-free, for adult and pediatric patients 6 mos+ (AFLURIA, FLUARIX, FLULAVAL, FLUZONE) (Completed)    Colon cancer screening        Relevant Orders    Ambulatory referral to Gastroenterology          Enchondroma of Right 5th Metatarsal  Continue orthopedic follow-up as needed    Menorrhagia with regular cycle  Check CBC with iron studies  Screening for lipid disorders  Check fasting lipids  Positive D-dimer  She has no clinical presentation of clot  Continue to monitor her clinically  Hold on repeat D-dimer  Stress fracture of right fibula  Check DEXA scan        There are no Patient Instructions on file for this visit         Chief Complaint     Chief Complaint   Patient presents with    Annual Exam     Last seen 8/15/2017    Flu Vaccine       History of Present Illness   Domo Craven is a 52y o -year-old female who presents today for annual physical   Overall, she remains in excellent health  She was having some issues previously with some diffuse pain as well as lightheadedness  A workup included positive EBV titers as well as a positive D-dimer  Fortunately, no clot was discovered  She was not clinically presenting with persistent Ebstein Lizarraga virus  She denies any excessive persistent fatigue, fever or chills  She has tried increase her hydration to overcome some persistent intermittent orthostasis  She exercises very vigorously routinely and denies any problems chest pain, shortness of breath, palpitations or lightheadedness  She does still have pretty excessive bleeding with her menstrual cycle  She does note she feels slightly fatigued as well as mildly lightheaded after her period  She recently was evaluated by Podiatry for a lesion on her right 5th metatarsal and had a workup including a bone scan  Fortunately this appears to be a benign enchondroma  Review of Systems   Review of Systems   Constitutional: Negative for appetite change, chills, fatigue, fever and unexpected weight change  HENT: Negative for trouble swallowing  Eyes: Negative for visual disturbance  Respiratory: Negative for cough, chest tightness, shortness of breath and wheezing  Cardiovascular: Negative for chest pain, palpitations and leg swelling  She is lightheadedness on occasion when standing abruptly or after significant exercise  Gastrointestinal: Negative for abdominal distention, abdominal pain, blood in stool, constipation and diarrhea  Endocrine: Negative for polyuria  Genitourinary: Negative for difficulty urinating and flank pain  Musculoskeletal: Negative for arthralgias and myalgias  Skin: Negative for rash  Neurological: Negative for dizziness, tremors, syncope, weakness, light-headedness, numbness and headaches  Hematological: Negative for adenopathy  Does not bruise/bleed easily  Psychiatric/Behavioral: Negative for sleep disturbance         Active Problem List     Patient Active Problem List   Diagnosis    Stress fracture of right fibula    Enchondroma of Right 5th Metatarsal    Shaheen-Barr virus seropositivity    Positive D-dimer    Menorrhagia with regular cycle    Screening for lipid disorders         Past Medical History:  Past Medical History:   Diagnosis Date    Known health problems: none        Past Surgical History:  Past Surgical History:   Procedure Laterality Date    BREAST BIOPSY      HAND SURGERY Left     INCISIONAL BREAST BIOPSY  2016    TONSILLECTOMY         Family History:  Family History   Problem Relation Age of Onset    Arrhythmia Mother     Hypertension Mother     Suicide Attempts Father     Suicide Attempts Brother     No Known Problems Daughter     No Known Problems Daughter        Social History:  Social History     Socioeconomic History    Marital status: /Civil Union     Spouse name: Not on file    Number of children: Not on file    Years of education: Not on file    Highest education level: Not on file   Occupational History    Not on file   Social Needs    Financial resource strain: Not on file    Food insecurity:     Worry: Not on file     Inability: Not on file    Transportation needs:     Medical: Not on file     Non-medical: Not on file   Tobacco Use    Smoking status: Never Smoker    Smokeless tobacco: Never Used   Substance and Sexual Activity    Alcohol use: Yes     Comment: occasional    Drug use: Never     Comment: No use    Sexual activity: Yes   Lifestyle    Physical activity:     Days per week: Not on file     Minutes per session: Not on file    Stress: Not on file   Relationships    Social connections: Talks on phone: Not on file     Gets together: Not on file     Attends Latter day service: Not on file     Active member of club or organization: Not on file     Attends meetings of clubs or organizations: Not on file     Relationship status: Not on file    Intimate partner violence:     Fear of current or ex partner: Not on file     Emotionally abused: Not on file     Physically abused: Not on file     Forced sexual activity: Not on file   Other Topics Concern    Not on file   Social History Narrative    Always uses seatbelts       Objective     Vitals:    10/30/19 1644   BP: 108/64   Pulse: 64   Resp: 16   SpO2: 99%     Wt Readings from Last 3 Encounters:   09/12/19 54 4 kg (120 lb)   08/01/17 55 8 kg (123 lb)   10/19/16 56 9 kg (125 lb 8 oz)       Physical Exam   Constitutional: She is oriented to person, place, and time  She appears well-developed and well-nourished  No distress  HENT:   Head: Normocephalic  Eyes: Pupils are equal, round, and reactive to light  Right eye exhibits no discharge  Left eye exhibits no discharge  Neck: No tracheal deviation present  No thyromegaly present  Cardiovascular: Normal rate, regular rhythm and normal heart sounds  No murmur heard  Pulmonary/Chest: Effort normal  No respiratory distress  She has no wheezes  She has no rales  Abdominal: Soft  She exhibits no distension  There is no tenderness  Musculoskeletal: Normal range of motion  She exhibits no edema  Lymphadenopathy:     She has no cervical adenopathy  Neurological: She is alert and oriented to person, place, and time  No cranial nerve deficit  Skin: Skin is warm  She is not diaphoretic  No erythema  Psychiatric: She has a normal mood and affect   Judgment and thought content normal        Pertinent Laboratory/Diagnostic Studies:  Lab Results   Component Value Date    BUN 13 08/01/2017    CREATININE 0 86 08/01/2017    CALCIUM 8 9 08/01/2017    K 4 4 08/01/2017    CO2 28 08/01/2017     08/01/2017     Lab Results   Component Value Date    ALT 22 08/01/2017    AST 20 08/01/2017    ALKPHOS 65 08/01/2017       Lab Results   Component Value Date    WBC 5 77 08/01/2017    HGB 13 6 08/01/2017    HCT 41 8 08/01/2017    MCV 94 08/01/2017     08/01/2017       No results found for: TSH    No results found for: CHOL  No results found for: TRIG  No results found for: HDL  No results found for: LDLCALC  No results found for: HGBA1C    Results for orders placed or performed in visit on 08/08/17   EBV acute panel   Result Value Ref Range    EBV Early Antigen Ab, IgG 27 6 (H) 0 0 - 8 9 U/mL    EBV VCA IgG 223 0 (H) 0 0 - 17 9 U/mL    EBV VCA IgM >160 0 (H) 0 0 - 35 9 U/mL    EBV Nuclear Ag Ab 143 0 (H) 0 0 - 17 9 U/mL    EBV Interp  Comment    CMV IgG/IgM Antibodies   Result Value Ref Range    CMV IGG <0 60 0 00 - 0 59 U/mL    CMV IgM <30 0 0 0 - 29 9 AU/mL   Parvovirus B19 antibody, IgG and IgM   Result Value Ref Range    Parvovirus B19 IgG 5 1 (H) 0 0 - 0 8 index    Parvovirus B19 IgM 0 1 0 0 - 0 8 index   D-dimer, quantitative   Result Value Ref Range    D-Dimer, Quant 8,958 (H) 0 - 424 ng/ml (FEU)       Orders Placed This Encounter   Procedures    DXA bone density spine hip and pelvis    influenza vaccine, [id number], quadrivalent, 0 5 mL, preservative-free, for adult and pediatric patients 6 mos+ (AFLURIA, FLUARIX, FLULAVAL, FLUZONE)    CBC and differential    Comprehensive metabolic panel    Iron, TIBC and Ferritin Panel    Lipid Panel with Direct LDL reflex    TSH, 3rd generation with Free T4 reflex    Ambulatory referral to Gastroenterology       ALLERGIES:  No Known Allergies    Current Medications     No current outpatient medications on file  No current facility-administered medications for this visit            Health Maintenance     Health Maintenance   Topic Date Due    DTaP,Tdap,and Td Vaccines (1 - Tdap) 06/15/1991    Cervical Cancer Screening  06/15/1991    BMI: Adult 09/12/2020    MAMMOGRAM  09/12/2020    Depression Screening PHQ  10/30/2020    Pneumococcal Vaccine: 65+ Years (1 of 2 - PCV13) 06/15/2035    INFLUENZA VACCINE  Completed    Pneumococcal Vaccine: Pediatrics (0 to 5 Years) and At-Risk Patients (6 to 59 Years)  Aged Out    HEPATITIS B VACCINES  Aged [employer] History   Administered Date(s) Administered    Influenza, injectable, quadrivalent, preservative free 0 5 mL 10/30/2019       Henrique Keys MD

## 2019-11-01 NOTE — ASSESSMENT & PLAN NOTE
She has no clinical presentation of clot  Continue to monitor her clinically  Hold on repeat D-dimer

## 2019-12-12 LAB
ALBUMIN SERPL-MCNC: 4.7 G/DL (ref 3.6–5.1)
ALBUMIN/GLOB SERPL: 1.9 (CALC) (ref 1–2.5)
ALP SERPL-CCNC: 52 U/L (ref 33–115)
ALT SERPL-CCNC: 17 U/L (ref 6–29)
AST SERPL-CCNC: 26 U/L (ref 10–35)
BASOPHILS # BLD AUTO: 59 CELLS/UL (ref 0–200)
BASOPHILS NFR BLD AUTO: 0.7 %
BILIRUB SERPL-MCNC: 1 MG/DL (ref 0.2–1.2)
BUN SERPL-MCNC: 22 MG/DL (ref 7–25)
BUN/CREAT SERPL: 17 (CALC) (ref 6–22)
CALCIUM SERPL-MCNC: 10.5 MG/DL (ref 8.6–10.2)
CHLORIDE SERPL-SCNC: 101 MMOL/L (ref 98–110)
CHOLEST SERPL-MCNC: 245 MG/DL
CHOLEST/HDLC SERPL: 2.5 (CALC)
CO2 SERPL-SCNC: 26 MMOL/L (ref 20–32)
CREAT SERPL-MCNC: 1.27 MG/DL (ref 0.5–1.1)
EOSINOPHIL # BLD AUTO: 17 CELLS/UL (ref 15–500)
EOSINOPHIL NFR BLD AUTO: 0.2 %
ERYTHROCYTE [DISTWIDTH] IN BLOOD BY AUTOMATED COUNT: 12.7 % (ref 11–15)
FERRITIN SERPL-MCNC: 26 NG/ML (ref 16–232)
GLOBULIN SER CALC-MCNC: 2.5 G/DL (CALC) (ref 1.9–3.7)
GLUCOSE SERPL-MCNC: 80 MG/DL (ref 65–99)
HCT VFR BLD AUTO: 39.4 % (ref 35–45)
HDLC SERPL-MCNC: 99 MG/DL
HGB BLD-MCNC: 13.5 G/DL (ref 11.7–15.5)
IRON SATN MFR SERPL: 38 % (CALC) (ref 16–45)
IRON SERPL-MCNC: 177 MCG/DL (ref 40–190)
LDLC SERPL CALC-MCNC: 130 MG/DL (CALC)
LYMPHOCYTES # BLD AUTO: 1705 CELLS/UL (ref 850–3900)
LYMPHOCYTES NFR BLD AUTO: 20.3 %
MCH RBC QN AUTO: 31 PG (ref 27–33)
MCHC RBC AUTO-ENTMCNC: 34.3 G/DL (ref 32–36)
MCV RBC AUTO: 90.4 FL (ref 80–100)
MONOCYTES # BLD AUTO: 605 CELLS/UL (ref 200–950)
MONOCYTES NFR BLD AUTO: 7.2 %
NEUTROPHILS # BLD AUTO: 6014 CELLS/UL (ref 1500–7800)
NEUTROPHILS NFR BLD AUTO: 71.6 %
NONHDLC SERPL-MCNC: 146 MG/DL (CALC)
PLATELET # BLD AUTO: 227 THOUSAND/UL (ref 140–400)
PMV BLD REES-ECKER: 13.5 FL (ref 7.5–12.5)
POTASSIUM SERPL-SCNC: 4.3 MMOL/L (ref 3.5–5.3)
PROT SERPL-MCNC: 7.2 G/DL (ref 6.1–8.1)
RBC # BLD AUTO: 4.36 MILLION/UL (ref 3.8–5.1)
SL AMB EGFR AFRICAN AMERICAN: 57 ML/MIN/1.73M2
SL AMB EGFR NON AFRICAN AMERICAN: 50 ML/MIN/1.73M2
SODIUM SERPL-SCNC: 138 MMOL/L (ref 135–146)
TIBC SERPL-MCNC: 468 MCG/DL (CALC) (ref 250–450)
TRIGL SERPL-MCNC: 67 MG/DL
TSH SERPL-ACNC: 2.42 MIU/L
WBC # BLD AUTO: 8.4 THOUSAND/UL (ref 3.8–10.8)

## 2019-12-18 ENCOUNTER — TELEPHONE (OUTPATIENT)
Dept: FAMILY MEDICINE CLINIC | Facility: CLINIC | Age: 49
End: 2019-12-18

## 2019-12-18 NOTE — TELEPHONE ENCOUNTER
Pt called today, she can see her lab tests online but I don't know that they were ever addressed yet  Can you advise if she needs to retest or what is going on

## 2019-12-19 DIAGNOSIS — R79.89 ELEVATED SERUM CREATININE: Primary | ICD-10-CM

## 2019-12-20 NOTE — TELEPHONE ENCOUNTER
I spoke to patient  Creatinine is elevated, but she notes that she had been doing very significant exercise with "hot yoga"just prior to getting the blood work done  She is going to hydrate and get a repeat BMP  In the near future  She does not take NSAIDs as a rule  She has no risk factors for acute renal failure

## 2020-01-27 ENCOUNTER — HOSPITAL ENCOUNTER (OUTPATIENT)
Dept: RADIOLOGY | Facility: HOSPITAL | Age: 50
Discharge: HOME | End: 2020-01-27

## 2020-02-19 ENCOUNTER — HOSPITAL ENCOUNTER (OUTPATIENT)
Dept: RADIOLOGY | Facility: HOSPITAL | Age: 50
Discharge: HOME | End: 2020-02-19
Payer: COMMERCIAL

## 2020-02-19 ENCOUNTER — TRANSCRIBE ORDERS (OUTPATIENT)
Dept: ADMINISTRATIVE | Facility: HOSPITAL | Age: 50
End: 2020-02-19

## 2020-02-19 DIAGNOSIS — M84.363S STRESS FRACTURE OF RIGHT FIBULA, SEQUELA: ICD-10-CM

## 2020-02-19 PROCEDURE — 77080 DXA BONE DENSITY AXIAL: CPT

## 2020-02-20 LAB
BUN SERPL-MCNC: 16 MG/DL (ref 7–25)
BUN/CREAT SERPL: NORMAL (CALC) (ref 6–22)
CALCIUM SERPL-MCNC: 9.1 MG/DL (ref 8.6–10.2)
CHLORIDE SERPL-SCNC: 107 MMOL/L (ref 98–110)
CO2 SERPL-SCNC: 28 MMOL/L (ref 20–32)
CREAT SERPL-MCNC: 0.86 MG/DL (ref 0.5–1.1)
GLUCOSE SERPL-MCNC: 87 MG/DL (ref 65–99)
POTASSIUM SERPL-SCNC: 4.3 MMOL/L (ref 3.5–5.3)
SL AMB EGFR AFRICAN AMERICAN: 92 ML/MIN/1.73M2
SL AMB EGFR NON AFRICAN AMERICAN: 79 ML/MIN/1.73M2
SODIUM SERPL-SCNC: 140 MMOL/L (ref 135–146)

## 2020-02-25 PROCEDURE — 88305 TISSUE EXAM BY PATHOLOGIST: CPT | Performed by: PATHOLOGY

## 2020-02-26 ENCOUNTER — LAB REQUISITION (OUTPATIENT)
Dept: LAB | Facility: HOSPITAL | Age: 50
End: 2020-02-26
Payer: COMMERCIAL

## 2020-02-26 DIAGNOSIS — N92.4 EXCESSIVE BLEEDING IN THE PREMENOPAUSAL PERIOD: ICD-10-CM

## 2020-09-22 ENCOUNTER — CONSULT (OUTPATIENT)
Dept: SURGERY | Facility: CLINIC | Age: 50
End: 2020-09-22
Payer: COMMERCIAL

## 2020-09-22 VITALS
TEMPERATURE: 98.2 F | HEART RATE: 80 BPM | HEIGHT: 66 IN | SYSTOLIC BLOOD PRESSURE: 116 MMHG | DIASTOLIC BLOOD PRESSURE: 74 MMHG | BODY MASS INDEX: 19.37 KG/M2

## 2020-09-22 DIAGNOSIS — K42.9 UMBILICAL HERNIA WITHOUT OBSTRUCTION AND WITHOUT GANGRENE: Primary | ICD-10-CM

## 2020-09-22 PROCEDURE — 1036F TOBACCO NON-USER: CPT | Performed by: SURGERY

## 2020-09-22 PROCEDURE — 99214 OFFICE O/P EST MOD 30 MIN: CPT | Performed by: SURGERY

## 2020-09-23 RX ORDER — CEFAZOLIN SODIUM 2 G/50ML
2000 SOLUTION INTRAVENOUS ONCE
Status: CANCELLED | OUTPATIENT
Start: 2020-11-19

## 2020-09-23 NOTE — PROGRESS NOTES
Assessment/Plan:    Umbilical hernia without obstruction and without gangrene   - will schedule for repair in conjunction with OB service   - Informed consent signed   - Pre-op abx - Ancef  Subjective:      Patient ID: Eric Padgett is a 48 y o  female who has had a pain just above her umbilicus associate with a palpable lump  Pt says pain in [place], intermittent and exacerbated by stretching her abdominal wall  She says she is able to push the mass back inside when she feels it  She has no change in bowel or bladder habits  HPI    The following portions of the patient's history were reviewed and updated as appropriate: allergies, current medications, past family history, past medical history, past social history, past surgical history and problem list     Review of Systems   Constitutional: Negative  HENT: Negative  Respiratory: Negative  Cardiovascular: Negative  Gastrointestinal:        As noted in HPI   Genitourinary: Negative  Musculoskeletal: Negative  Skin: Negative  Neurological: Negative  Psychiatric/Behavioral: Negative  Objective:      /74   Pulse 80   Temp 98 2 °F (36 8 °C) (Temporal)   Ht 5' 6" (1 676 m)   BMI 19 37 kg/m²          Physical Exam  Constitutional:       Appearance: Normal appearance  She is normal weight  HENT:      Head: Normocephalic and atraumatic  Right Ear: External ear normal       Left Ear: External ear normal       Nose: Nose normal  No congestion or rhinorrhea  Mouth/Throat:      Pharynx: Oropharynx is clear  No posterior oropharyngeal erythema  Eyes:      General: No scleral icterus  Pupils: Pupils are equal, round, and reactive to light  Neck:      Musculoskeletal: Neck supple  Cardiovascular:      Rate and Rhythm: Normal rate and regular rhythm  Heart sounds: No murmur  Pulmonary:      Effort: Pulmonary effort is normal       Breath sounds: Normal breath sounds  No wheezing     Abdominal:      General: Abdomen is flat  There is no distension  Palpations: Abdomen is soft  There is mass  Tenderness: There is abdominal tenderness  There is no guarding  Comments: Small mildly tender mass just superior to umbilicus  Not reducible at this time  No erythema  Musculoskeletal: Normal range of motion  General: No swelling, tenderness or signs of injury  Skin:     General: Skin is warm and dry  Coloration: Skin is not jaundiced  Neurological:      General: No focal deficit present  Mental Status: She is alert and oriented to person, place, and time  Psychiatric:         Mood and Affect: Mood normal          Behavior: Behavior normal          Thought Content:  Thought content normal          Judgment: Judgment normal

## 2020-09-29 ENCOUNTER — TELEPHONE (OUTPATIENT)
Dept: SURGERY | Facility: CLINIC | Age: 50
End: 2020-09-29

## 2020-10-15 ENCOUNTER — TELEPHONE (OUTPATIENT)
Dept: SURGERY | Facility: CLINIC | Age: 50
End: 2020-10-15

## 2020-11-16 PROCEDURE — NC001 PR NO CHARGE: Performed by: OBSTETRICS & GYNECOLOGY

## 2020-11-18 ENCOUNTER — ANESTHESIA EVENT (OUTPATIENT)
Dept: PERIOP | Facility: HOSPITAL | Age: 50
End: 2020-11-18
Payer: COMMERCIAL

## 2020-11-19 ENCOUNTER — HOSPITAL ENCOUNTER (OUTPATIENT)
Facility: HOSPITAL | Age: 50
Setting detail: OUTPATIENT SURGERY
Discharge: HOME | End: 2020-11-19
Attending: OBSTETRICS & GYNECOLOGY | Admitting: OBSTETRICS & GYNECOLOGY
Payer: COMMERCIAL

## 2020-11-19 ENCOUNTER — ANESTHESIA (OUTPATIENT)
Dept: PERIOP | Facility: HOSPITAL | Age: 50
End: 2020-11-19
Payer: COMMERCIAL

## 2020-11-19 VITALS
WEIGHT: 122 LBS | RESPIRATION RATE: 16 BRPM | DIASTOLIC BLOOD PRESSURE: 64 MMHG | HEART RATE: 53 BPM | HEIGHT: 66 IN | OXYGEN SATURATION: 100 % | SYSTOLIC BLOOD PRESSURE: 101 MMHG | TEMPERATURE: 97.8 F | BODY MASS INDEX: 19.61 KG/M2

## 2020-11-19 VITALS — HEART RATE: 51 BPM

## 2020-11-19 DIAGNOSIS — N92.4 EXCESSIVE BLEEDING IN THE PREMENOPAUSAL PERIOD: ICD-10-CM

## 2020-11-19 DIAGNOSIS — N84.0 POLYP OF CORPUS UTERI: ICD-10-CM

## 2020-11-19 PROBLEM — Z98.890 PONV (POSTOPERATIVE NAUSEA AND VOMITING): Status: ACTIVE | Noted: 2020-11-19

## 2020-11-19 PROBLEM — Z98.890 STATUS POST HYSTEROSCOPY: Status: ACTIVE | Noted: 2020-11-19

## 2020-11-19 PROBLEM — R11.2 PONV (POSTOPERATIVE NAUSEA AND VOMITING): Status: ACTIVE | Noted: 2020-11-19

## 2020-11-19 LAB
EXT PREGNANCY TEST URINE: NEGATIVE
EXT. CONTROL: NORMAL

## 2020-11-19 PROCEDURE — 81025 URINE PREGNANCY TEST: CPT | Performed by: OBSTETRICS & GYNECOLOGY

## 2020-11-19 PROCEDURE — 88305 TISSUE EXAM BY PATHOLOGIST: CPT | Performed by: PATHOLOGY

## 2020-11-19 PROCEDURE — NC001 PR NO CHARGE: Performed by: OBSTETRICS & GYNECOLOGY

## 2020-11-19 RX ORDER — ONDANSETRON 2 MG/ML
4 INJECTION INTRAMUSCULAR; INTRAVENOUS EVERY 6 HOURS PRN
Status: DISCONTINUED | OUTPATIENT
Start: 2020-11-19 | End: 2020-11-19 | Stop reason: HOSPADM

## 2020-11-19 RX ORDER — SCOLOPAMINE TRANSDERMAL SYSTEM 1 MG/1
1 PATCH, EXTENDED RELEASE TRANSDERMAL ONCE AS NEEDED
Status: DISCONTINUED | OUTPATIENT
Start: 2020-11-19 | End: 2020-11-19

## 2020-11-19 RX ORDER — ONDANSETRON 2 MG/ML
INJECTION INTRAMUSCULAR; INTRAVENOUS AS NEEDED
Status: DISCONTINUED | OUTPATIENT
Start: 2020-11-19 | End: 2020-11-19

## 2020-11-19 RX ORDER — MAGNESIUM HYDROXIDE 1200 MG/15ML
LIQUID ORAL AS NEEDED
Status: DISCONTINUED | OUTPATIENT
Start: 2020-11-19 | End: 2020-11-19 | Stop reason: HOSPADM

## 2020-11-19 RX ORDER — HYDROMORPHONE HCL/PF 1 MG/ML
0.5 SYRINGE (ML) INJECTION
Status: DISCONTINUED | OUTPATIENT
Start: 2020-11-19 | End: 2020-11-19 | Stop reason: HOSPADM

## 2020-11-19 RX ORDER — OXYCODONE HYDROCHLORIDE 5 MG/1
5 TABLET ORAL EVERY 4 HOURS PRN
Status: DISCONTINUED | OUTPATIENT
Start: 2020-11-19 | End: 2020-11-19 | Stop reason: HOSPADM

## 2020-11-19 RX ORDER — SODIUM CHLORIDE 9 MG/ML
125 INJECTION, SOLUTION INTRAVENOUS CONTINUOUS
Status: DISCONTINUED | OUTPATIENT
Start: 2020-11-19 | End: 2020-11-19

## 2020-11-19 RX ORDER — PROPOFOL 10 MG/ML
INJECTION, EMULSION INTRAVENOUS AS NEEDED
Status: DISCONTINUED | OUTPATIENT
Start: 2020-11-19 | End: 2020-11-19

## 2020-11-19 RX ORDER — MIDAZOLAM HYDROCHLORIDE 2 MG/2ML
INJECTION, SOLUTION INTRAMUSCULAR; INTRAVENOUS AS NEEDED
Status: DISCONTINUED | OUTPATIENT
Start: 2020-11-19 | End: 2020-11-19

## 2020-11-19 RX ORDER — ACETAMINOPHEN 325 MG/1
650 TABLET ORAL EVERY 6 HOURS PRN
Status: DISCONTINUED | OUTPATIENT
Start: 2020-11-19 | End: 2020-11-19 | Stop reason: HOSPADM

## 2020-11-19 RX ORDER — FENTANYL CITRATE 50 UG/ML
INJECTION, SOLUTION INTRAMUSCULAR; INTRAVENOUS AS NEEDED
Status: DISCONTINUED | OUTPATIENT
Start: 2020-11-19 | End: 2020-11-19

## 2020-11-19 RX ORDER — FENTANYL CITRATE/PF 50 MCG/ML
50 SYRINGE (ML) INJECTION
Status: DISCONTINUED | OUTPATIENT
Start: 2020-11-19 | End: 2020-11-19 | Stop reason: HOSPADM

## 2020-11-19 RX ORDER — DEXAMETHASONE SODIUM PHOSPHATE 4 MG/ML
INJECTION, SOLUTION INTRA-ARTICULAR; INTRALESIONAL; INTRAMUSCULAR; INTRAVENOUS; SOFT TISSUE AS NEEDED
Status: DISCONTINUED | OUTPATIENT
Start: 2020-11-19 | End: 2020-11-19

## 2020-11-19 RX ORDER — ONDANSETRON 2 MG/ML
4 INJECTION INTRAMUSCULAR; INTRAVENOUS ONCE AS NEEDED
Status: DISCONTINUED | OUTPATIENT
Start: 2020-11-19 | End: 2020-11-19 | Stop reason: HOSPADM

## 2020-11-19 RX ORDER — IBUPROFEN 600 MG/1
600 TABLET ORAL EVERY 6 HOURS PRN
Status: DISCONTINUED | OUTPATIENT
Start: 2020-11-19 | End: 2020-11-19 | Stop reason: HOSPADM

## 2020-11-19 RX ORDER — PROPOFOL 10 MG/ML
INJECTION, EMULSION INTRAVENOUS CONTINUOUS PRN
Status: DISCONTINUED | OUTPATIENT
Start: 2020-11-19 | End: 2020-11-19

## 2020-11-19 RX ORDER — KETOROLAC TROMETHAMINE 30 MG/ML
INJECTION, SOLUTION INTRAMUSCULAR; INTRAVENOUS AS NEEDED
Status: DISCONTINUED | OUTPATIENT
Start: 2020-11-19 | End: 2020-11-19

## 2020-11-19 RX ORDER — BUPIVACAINE HYDROCHLORIDE 2.5 MG/ML
INJECTION, SOLUTION EPIDURAL; INFILTRATION; INTRACAUDAL AS NEEDED
Status: DISCONTINUED | OUTPATIENT
Start: 2020-11-19 | End: 2020-11-19 | Stop reason: HOSPADM

## 2020-11-19 RX ORDER — MEPERIDINE HYDROCHLORIDE 25 MG/ML
12.5 INJECTION INTRAMUSCULAR; INTRAVENOUS; SUBCUTANEOUS ONCE AS NEEDED
Status: DISCONTINUED | OUTPATIENT
Start: 2020-11-19 | End: 2020-11-19 | Stop reason: HOSPADM

## 2020-11-19 RX ADMIN — PROPOFOL 30 MG: 10 INJECTION, EMULSION INTRAVENOUS at 14:16

## 2020-11-19 RX ADMIN — MIDAZOLAM 2 MG: 1 INJECTION INTRAMUSCULAR; INTRAVENOUS at 13:55

## 2020-11-19 RX ADMIN — PROPOFOL 100 MCG/KG/MIN: 10 INJECTION, EMULSION INTRAVENOUS at 14:04

## 2020-11-19 RX ADMIN — FENTANYL CITRATE 100 MCG: 50 INJECTION, SOLUTION INTRAMUSCULAR; INTRAVENOUS at 14:04

## 2020-11-19 RX ADMIN — ONDANSETRON 4 MG: 2 INJECTION INTRAMUSCULAR; INTRAVENOUS at 13:55

## 2020-11-19 RX ADMIN — SODIUM CHLORIDE 125 ML/HR: 0.9 INJECTION, SOLUTION INTRAVENOUS at 11:40

## 2020-11-19 RX ADMIN — PROPOFOL 50 MG: 10 INJECTION, EMULSION INTRAVENOUS at 14:05

## 2020-11-19 RX ADMIN — SCOPALAMINE 1 PATCH: 1 PATCH, EXTENDED RELEASE TRANSDERMAL at 11:40

## 2020-11-19 RX ADMIN — SODIUM CHLORIDE 125 ML/HR: 0.9 INJECTION, SOLUTION INTRAVENOUS at 15:16

## 2020-11-19 RX ADMIN — KETOROLAC TROMETHAMINE 30 MG: 30 INJECTION, SOLUTION INTRAMUSCULAR at 14:34

## 2020-11-19 RX ADMIN — DEXAMETHASONE SODIUM PHOSPHATE 4 MG: 4 INJECTION, SOLUTION INTRAMUSCULAR; INTRAVENOUS at 14:05

## 2020-11-22 ENCOUNTER — HOSPITAL ENCOUNTER (EMERGENCY)
Facility: HOSPITAL | Age: 50
Discharge: HOME | End: 2020-11-22
Attending: EMERGENCY MEDICINE
Payer: COMMERCIAL

## 2020-11-22 VITALS
TEMPERATURE: 99.4 F | OXYGEN SATURATION: 100 % | HEART RATE: 79 BPM | SYSTOLIC BLOOD PRESSURE: 130 MMHG | RESPIRATION RATE: 16 BRPM | DIASTOLIC BLOOD PRESSURE: 84 MMHG

## 2020-11-22 DIAGNOSIS — M79.10 MYALGIA: Primary | ICD-10-CM

## 2020-11-22 LAB
FLUAV RNA RESP QL NAA+PROBE: NEGATIVE
FLUBV RNA RESP QL NAA+PROBE: NEGATIVE
RSV RNA RESP QL NAA+PROBE: NEGATIVE
SARS-COV-2 RNA RESP QL NAA+PROBE: NEGATIVE

## 2020-11-22 PROCEDURE — 99283 EMERGENCY DEPT VISIT LOW MDM: CPT

## 2020-11-22 PROCEDURE — 99281 EMR DPT VST MAYX REQ PHY/QHP: CPT | Performed by: EMERGENCY MEDICINE

## 2020-11-22 PROCEDURE — 0241U HB NFCT DS VIR RESP RNA 4 TRGT: CPT | Performed by: EMERGENCY MEDICINE

## 2020-11-24 ENCOUNTER — HOSPITAL ENCOUNTER (EMERGENCY)
Facility: HOSPITAL | Age: 50
Discharge: HOME | End: 2020-11-24
Attending: EMERGENCY MEDICINE
Payer: COMMERCIAL

## 2020-11-24 VITALS
OXYGEN SATURATION: 98 % | DIASTOLIC BLOOD PRESSURE: 63 MMHG | RESPIRATION RATE: 18 BRPM | SYSTOLIC BLOOD PRESSURE: 125 MMHG | HEART RATE: 56 BPM | TEMPERATURE: 98.1 F

## 2020-11-24 DIAGNOSIS — Z98.890 S/P D&C (STATUS POST DILATION AND CURETTAGE): ICD-10-CM

## 2020-11-24 DIAGNOSIS — J06.9 URI (UPPER RESPIRATORY INFECTION): Primary | ICD-10-CM

## 2020-11-24 DIAGNOSIS — Z20.822 CLOSE EXPOSURE TO COVID-19 VIRUS: ICD-10-CM

## 2020-11-24 PROCEDURE — 99284 EMERGENCY DEPT VISIT MOD MDM: CPT

## 2020-11-24 PROCEDURE — 0241U HB NFCT DS VIR RESP RNA 4 TRGT: CPT | Performed by: EMERGENCY MEDICINE

## 2020-11-24 PROCEDURE — 99284 EMERGENCY DEPT VISIT MOD MDM: CPT | Performed by: EMERGENCY MEDICINE

## 2021-01-13 ENCOUNTER — IMMUNIZATIONS (OUTPATIENT)
Dept: FAMILY MEDICINE CLINIC | Facility: HOSPITAL | Age: 51
End: 2021-01-13

## 2021-01-13 DIAGNOSIS — Z23 ENCOUNTER FOR IMMUNIZATION: ICD-10-CM

## 2021-01-13 PROCEDURE — 91300 SARS-COV-2 / COVID-19 MRNA VACCINE (PFIZER-BIONTECH) 30 MCG: CPT

## 2021-01-13 PROCEDURE — 0001A SARS-COV-2 / COVID-19 MRNA VACCINE (PFIZER-BIONTECH) 30 MCG: CPT

## 2021-01-22 ENCOUNTER — TRANSCRIBE ORDERS (OUTPATIENT)
Dept: ADMINISTRATIVE | Facility: HOSPITAL | Age: 51
End: 2021-01-22

## 2021-01-22 DIAGNOSIS — Z12.39 BREAST CANCER SCREENING OTHER THAN MAMMOGRAM: ICD-10-CM

## 2021-01-22 DIAGNOSIS — Z12.31 SCREENING MAMMOGRAM FOR HIGH-RISK PATIENT: Primary | ICD-10-CM

## 2021-01-31 DIAGNOSIS — U07.1 COVID-19: Primary | ICD-10-CM

## 2021-02-01 ENCOUNTER — HOSPITAL ENCOUNTER (EMERGENCY)
Facility: HOSPITAL | Age: 51
Discharge: HOME | End: 2021-02-01
Attending: EMERGENCY MEDICINE | Admitting: EMERGENCY MEDICINE
Payer: COMMERCIAL

## 2021-02-01 VITALS
OXYGEN SATURATION: 99 % | TEMPERATURE: 97 F | DIASTOLIC BLOOD PRESSURE: 56 MMHG | RESPIRATION RATE: 18 BRPM | SYSTOLIC BLOOD PRESSURE: 113 MMHG | HEART RATE: 57 BPM

## 2021-02-01 DIAGNOSIS — Z20.822 PERSON UNDER INVESTIGATION FOR COVID-19: Primary | ICD-10-CM

## 2021-02-01 PROCEDURE — 99281 EMR DPT VST MAYX REQ PHY/QHP: CPT | Performed by: EMERGENCY MEDICINE

## 2021-02-01 PROCEDURE — 99283 EMERGENCY DEPT VISIT LOW MDM: CPT

## 2021-02-01 PROCEDURE — 0241U HB NFCT DS VIR RESP RNA 4 TRGT: CPT | Performed by: EMERGENCY MEDICINE

## 2021-02-01 NOTE — ED PROVIDER NOTES
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 [place]  Patient is currently asymptomatic  She denies cough fever shortness of breath  No nausea, vomiting, diarrhea  History provided by:  Patient, medical records and spouse   used: No    Medical Problem  Location:  Possible COVID exposure  Quality:  Asymptomatic  Severity:  Unable to specify  Timing:  Unable to specify  Progression:  Unable to specify  Chronicity:  New  Context:  Possible COVID exposure  Relieved by:  Nothing  Worsened by:  Nothing  Ineffective treatments:  None  Associated symptoms: no abdominal pain, no fever and no vomiting        None       Past Medical History:   Diagnosis Date    Known health problems: none     Low blood pressure     PONV (postoperative nausea and vomiting)     Pt requests to be pre-medicated for severe N/V    Shingles     Stress fracture of right fibula     Viral meningitis        Past Surgical History:   Procedure Laterality Date    BREAST BIOPSY      HAND SURGERY Left     INCISIONAL BREAST BIOPSY  2016    WA HYSTEROSCOPY,W/ENDO BX N/A 11/19/2020    Procedure: D&C, HYSTEROSCOPY;  Surgeon: Marquis Hieu DO;  Location: [facility];  Service: Gynecology    WA HYSTEROSCOPY,W/ENDOMETRIAL ABLATION N/A 11/19/2020    Procedure: ABLATION;  Surgeon: Marquis Hieu DO;  Location: [facility];  Service: Gynecology    TONSILLECTOMY      WISDOM TOOTH EXTRACTION         Family History   Problem Relation Age of Onset    Arrhythmia Mother     Hypertension Mother     Suicide Attempts Father     Suicide Attempts Brother     No Known Problems Daughter     No Known Problems Daughter      I have reviewed and agree with the history as documented      E-Cigarette/Vaping    E-Cigarette Use Never User      E-Cigarette/Vaping Substances    Nicotine No     THC No     CBD No     Flavoring No     Other No     Unknown No      Social History     Tobacco Use    Smoking status: Never Smoker    Smokeless tobacco: Never Used   Substance Use Topics    Alcohol use: Yes     Frequency: Monthly or less     Drinks per session: 1 or 2     Binge frequency: Never    Drug use: Never     Comment: No use       Review of Systems   Constitutional: Negative for fever  Gastrointestinal: Negative for abdominal pain and vomiting  All other systems reviewed and are negative  Physical Exam  Physical Exam  Vitals signs and nursing note reviewed  Constitutional:       General: She is not in acute distress  Appearance: She is well-developed and normal weight  She is not ill-appearing, toxic-appearing or diaphoretic  HENT:      Head: Normocephalic  Right Ear: External ear normal       Left Ear: External ear normal       Nose: Nose normal    Eyes:      General: Lids are normal       Pupils: Pupils are equal, round, and reactive to light  Neck:      Musculoskeletal: Normal range of motion and neck supple  Cardiovascular:      Rate and Rhythm: Bradycardia present  Pulmonary:      Effort: Pulmonary effort is normal  No respiratory distress  Musculoskeletal: Normal range of motion  General: No deformity  Skin:     General: Skin is warm and dry  Neurological:      General: No focal deficit present  Mental Status: She is alert and oriented to person, place, and time     Psychiatric:         Mood and Affect: Mood normal          Vital Signs  ED Triage Vitals [02/01/21 1227]   Temperature Pulse Respirations Blood Pressure SpO2   (!) 97 °F (36 1 °C) 57 18 113/56 99 %      Temp Source Heart Rate Source Patient Position - Orthostatic VS BP Location FiO2 (%)   Tympanic Monitor Sitting Left arm --      Pain Score       --           Vitals:    02/01/21 1227   BP: 113/56   Pulse: 57   Patient Position - Orthostatic VS: Sitting         Visual Acuity      ED Medications  Medications - No data to display    Diagnostic Studies  Results Reviewed     Procedure Component Value Units Date/Time    COVID19, Influenza A/B, RSV PCR, [facility] [[id number]]  (Normal) Collected: 02/01/21 1231    Lab Status: Final result Specimen: Nares from Nasopharyngeal Swab Updated: 02/01/21 1330     SARS-CoV-2 Negative     INFLUENZA A PCR Negative     INFLUENZA B PCR Negative     RSV PCR Negative    Narrative: This test has been authorized by FDA under an EUA (Emergency Use Assay) for use by authorized laboratories  Clinical caution and judgement should be used with the interpretation of these results with consideration of the clinical impression and other laboratory testing  Testing reported as "Positive" or "Negative" has been proven to be accurate according to standard laboratory validation requirements  All testing is performed with control materials showing appropriate reactivity at standard intervals                   No orders to display              Procedures  Procedures         ED Course                                           MDM  Number of Diagnoses or Management Options  Person under investigation for COVID-19: new and requires workup     Amount and/or Complexity of Data Reviewed  Clinical lab tests: ordered and reviewed  Decide to obtain previous medical records or to obtain history from someone other than the patient: yes  Independent visualization of images, tracings, or specimens: yes    Risk of Complications, Morbidity, and/or Mortality  General comments: Patient notified of negative COVID-19 test results    Patient Progress  Patient progress: stable      Disposition  Final diagnoses:   Person under investigation for COVID-19     Time reflects when diagnosis was documented in both MDM as applicable and the Disposition within this note     Time User Action Codes Description Comment    2/1/2021 12:35 PM Tammie Lupe Velazco [[id number]] Person under investigation for COVID-19       ED Disposition     ED Disposition Condition Date/Time Comment    Discharge Stable Mon Feb 1, 2021 12:35 PM Anibal Munoz discharge to home/self care  Follow-up Information    None         There are no discharge medications for this patient  No discharge procedures on file      PDMP Review     None          ED Provider  Electronically Signed by           Ramy Edward DO  02/02/21 1640

## 2021-02-03 ENCOUNTER — IMMUNIZATIONS (OUTPATIENT)
Dept: FAMILY MEDICINE CLINIC | Facility: HOSPITAL | Age: 51
End: 2021-02-03

## 2021-02-03 DIAGNOSIS — Z23 ENCOUNTER FOR IMMUNIZATION: Primary | ICD-10-CM

## 2021-02-03 PROCEDURE — 91300 SARS-COV-2 / COVID-19 MRNA VACCINE (PFIZER-BIONTECH) 30 MCG: CPT

## 2021-02-03 PROCEDURE — 0002A SARS-COV-2 / COVID-19 MRNA VACCINE (PFIZER-BIONTECH) 30 MCG: CPT

## 2021-03-04 ENCOUNTER — HOSPITAL ENCOUNTER (OUTPATIENT)
Dept: RADIOLOGY | Facility: HOSPITAL | Age: 51
Discharge: HOME | End: 2021-03-04
Payer: COMMERCIAL

## 2021-03-04 VITALS — WEIGHT: 122 LBS | HEIGHT: 66 IN | BODY MASS INDEX: 19.61 KG/M2

## 2021-03-04 DIAGNOSIS — Z12.31 SCREENING MAMMOGRAM FOR HIGH-RISK PATIENT: ICD-10-CM

## 2021-03-04 DIAGNOSIS — Z12.39 BREAST CANCER SCREENING OTHER THAN MAMMOGRAM: ICD-10-CM

## 2021-03-04 PROCEDURE — 77063 BREAST TOMOSYNTHESIS BI: CPT

## 2021-03-04 PROCEDURE — 76377 3D RENDER W/INTRP POSTPROCES: CPT

## 2021-03-04 PROCEDURE — 77067 SCR MAMMO BI INCL CAD: CPT

## 2021-03-04 PROCEDURE — 76641 ULTRASOUND BREAST COMPLETE: CPT

## 2021-04-30 ENCOUNTER — OFFICE VISIT (OUTPATIENT)
Dept: FAMILY MEDICINE CLINIC | Facility: CLINIC | Age: 51
End: 2021-04-30
Payer: COMMERCIAL

## 2021-04-30 VITALS
WEIGHT: 122 LBS | OXYGEN SATURATION: 98 % | BODY MASS INDEX: 19.61 KG/M2 | RESPIRATION RATE: 18 BRPM | HEIGHT: 66 IN | SYSTOLIC BLOOD PRESSURE: 120 MMHG | HEART RATE: 72 BPM | DIASTOLIC BLOOD PRESSURE: 78 MMHG

## 2021-04-30 DIAGNOSIS — M22.2X2 PATELLOFEMORAL PAIN SYNDROME OF LEFT KNEE: ICD-10-CM

## 2021-04-30 DIAGNOSIS — E78.2 MIXED HYPERLIPIDEMIA: ICD-10-CM

## 2021-04-30 DIAGNOSIS — Z12.11 COLON CANCER SCREENING: ICD-10-CM

## 2021-04-30 DIAGNOSIS — G44.52 NEW DAILY PERSISTENT HEADACHE: Primary | ICD-10-CM

## 2021-04-30 PROCEDURE — 3725F SCREEN DEPRESSION PERFORMED: CPT | Performed by: FAMILY MEDICINE

## 2021-04-30 PROCEDURE — 99214 OFFICE O/P EST MOD 30 MIN: CPT | Performed by: FAMILY MEDICINE

## 2021-04-30 PROCEDURE — 3008F BODY MASS INDEX DOCD: CPT | Performed by: FAMILY MEDICINE

## 2021-04-30 PROCEDURE — 1036F TOBACCO NON-USER: CPT | Performed by: FAMILY MEDICINE

## 2021-04-30 RX ORDER — MELOXICAM 15 MG/1
15 TABLET ORAL DAILY
Qty: 30 TABLET | Refills: 1 | Status: SHIPPED | OUTPATIENT
Start: 2021-04-30 | End: 2022-03-25 | Stop reason: ALTCHOICE

## 2021-04-30 NOTE — ASSESSMENT & PLAN NOTE
She seems to be improving overall with activity modification  I did prescribe meloxicam that she can take daily or as needed for the next few weeks

## 2021-04-30 NOTE — PROGRESS NOTES
Assessment/Plan:       Problem List Items Addressed This Visit        Musculoskeletal and Integument    Patellofemoral pain syndrome of left knee     She seems to be improving overall with activity modification  I did prescribe meloxicam that she can take daily or as needed for the next few weeks  Relevant Medications    meloxicam (MOBIC) 15 mg tablet       Other    Mixed hyperlipidemia    Relevant Orders    Comprehensive metabolic panel    Lipid Panel with Direct LDL reflex      Other Visit Diagnoses     New daily persistent headache    -  Primary    She has a new onset of daily headaches  Check sinus imaging initially due to location  Trial of meloxicam as needed  Relevant Medications    meloxicam (MOBIC) 15 mg tablet    Other Relevant Orders    CBC and differential    TSH, 3rd generation with Free T4 reflex    CT sinus wo contrast    Colon cancer screening        Relevant Orders    Ambulatory referral to Gastroenterology            Subjective:      Patient ID: Cristian Anne is a 48 y o  female  HPI patient presents today complaining of a left-sided headache  This can be intermittently severe  It is worse with palpation  This is a new finding for over the past 2-3 weeks  She does note a small prominence over the bone  She denies fever or chills  She also is noting some pain in her left knee  She exercises routinely and has some anterior knee pain  She denies any acute injury  The pain does get somewhat worse after a prolonged workout and on occasion if she is seated for a long time      The following portions of the patient's history were reviewed and updated as appropriate: allergies, current medications, past family history, past medical history, past social history, past surgical history and problem list       Current Outpatient Medications:     meloxicam (MOBIC) 15 mg tablet, Take 1 tablet (15 mg total) by mouth daily, Disp: 30 tablet, Rfl: 1     Review of Systems Constitutional: Negative for appetite change, chills, fatigue, fever and unexpected weight change  HENT: Positive for sinus pressure and sinus pain  Negative for congestion, ear pain, facial swelling, postnasal drip and trouble swallowing  Eyes: Negative for visual disturbance  Respiratory: Negative for cough, chest tightness, shortness of breath and wheezing  Cardiovascular: Negative for chest pain, palpitations and leg swelling  Gastrointestinal: Negative for abdominal distention, abdominal pain, blood in stool, constipation and diarrhea  Endocrine: Negative for polyuria  Genitourinary: Negative for difficulty urinating and flank pain  Musculoskeletal: Negative for arthralgias and myalgias  Skin: Negative for rash  Neurological: Positive for headaches  Negative for dizziness and light-headedness  Hematological: Negative for adenopathy  Does not bruise/bleed easily  Psychiatric/Behavioral: Negative for dysphoric mood and sleep disturbance  The patient is not nervous/anxious  Objective:      /78 (BP Location: Left arm, Patient Position: Sitting, Cuff Size: Standard)   Pulse 72   Resp 18   Ht 5' 6" (1 676 m)   Wt 55 3 kg (122 lb)   SpO2 98%   BMI 19 69 kg/m²          Physical Exam  Constitutional:       General: She is not in acute distress  Appearance: She is well-developed  She is not diaphoretic  HENT:      Head: Normocephalic  Eyes:      General:         Right eye: No discharge  Left eye: No discharge  Pupils: Pupils are equal, round, and reactive to light  Neck:      Thyroid: No thyromegaly  Trachea: No tracheal deviation  Cardiovascular:      Rate and Rhythm: Normal rate and regular rhythm  Heart sounds: Normal heart sounds  No murmur  Pulmonary:      Effort: Pulmonary effort is normal  No respiratory distress  Breath sounds: No wheezing or rales  Abdominal:      General: There is no distension        Palpations: Abdomen is soft  Tenderness: There is no abdominal tenderness  Musculoskeletal: Normal range of motion  Comments: She has some mild prominence of the left periorbital area laterally but no masses noted  Lymphadenopathy:      Cervical: No cervical adenopathy  Skin:     General: Skin is warm  Findings: No erythema  Neurological:      General: No focal deficit present  Mental Status: She is alert and oriented to person, place, and time  Cranial Nerves: No cranial nerve deficit  Psychiatric:         Thought Content:  Thought content normal          Judgment: Judgment normal            Yelitza Dowling MD

## 2021-05-03 ENCOUNTER — TELEPHONE (OUTPATIENT)
Dept: FAMILY MEDICINE CLINIC | Facility: CLINIC | Age: 51
End: 2021-05-03

## 2021-05-03 ENCOUNTER — TELEPHONE (OUTPATIENT)
Dept: ADMINISTRATIVE | Facility: OTHER | Age: 51
End: 2021-05-03

## 2021-05-03 NOTE — TELEPHONE ENCOUNTER
----- Message from Fam Velez sent at 4/30/2021  1:48 PM EDT -----  Regarding: PAP / [facility]  04/30/21 1:49 PM    Hello, our patient Larry Aburto has had Pap Smear (HPV) aka Cervical Cancer Screening completed/performed   Please assist in updating the patient chart by making an External outreach to     [address] [place], [place] [address]d and Gynecology Phone: [phone]; Fax: [phone]      Thank you,  ABBE Velez  66

## 2021-05-03 NOTE — TELEPHONE ENCOUNTER
Upon review of the In Basket request and the patient's chart, initial outreach has been made via fax, please see Contacts section for details       Thank you  uLke Velez

## 2021-05-03 NOTE — TELEPHONE ENCOUNTER
Prior Auth for CT Sinus started on 5/3/21 via [facility]   Clinical Info provided and status is pending

## 2021-05-03 NOTE — LETTER
Procedure Request Form: Cervical Cancer Screening      Date Requested: 21  Patient: Cristian Anne  Patient : 1970   Referring Provider: Kelli Sicard, MD        Date of Procedure ______________________________       The above patient has informed us that they have completed their   most recent Cervical Cancer Screening at your facility  Please complete   this form and attach all corresponding procedure reports/results  Comments ______Please send lab results, Thanks :)____________________________________________________  ____________________________________________________________________  ____________________________________________________________________  ____________________________________________________________________    Facility Completing Procedure _________________________________________    Form Completed By (print name) _______________________________________      Signature __________________________________________________________      These reports are needed for  compliance    Please fax this completed form and a copy of the procedure report to our office located at [place] [address] as soon as possible to [phone] keven Stern [place]: Phone [phone]    We thank you for your assistance in treating our mutual patient

## 2021-05-06 NOTE — TELEPHONE ENCOUNTER
Upon review of the In Basket request we were able to locate, review, and update the patient chart as requested for Pap Smear (HPV) aka Cervical Cancer Screening  Any additional questions or concerns should be emailed to the Practice Liaisons via [email or web address] email, please do not reply via In Basket      Thank you  Adrian Graham

## 2021-05-11 ENCOUNTER — TELEPHONE (OUTPATIENT)
Dept: FAMILY MEDICINE CLINIC | Facility: CLINIC | Age: 51
End: 2021-05-11

## 2021-05-11 DIAGNOSIS — G44.52 NEW PERSISTENT DAILY HEADACHE: Primary | ICD-10-CM

## 2021-05-11 DIAGNOSIS — B02.30 HERPES ZOSTER WITH OPHTHALMIC COMPLICATION, UNSPECIFIED HERPES ZOSTER EYE DISEASE: ICD-10-CM

## 2021-05-11 NOTE — PROGRESS NOTES
Patient is having some persistent daily headaches which are new  I am concerned and she should have an MRI of her brain since her insurance company will not cover the previously ordered CT of her sinuses  Her headaches persist   She is having some significant left periorbital pain and is concerned that this is similar to her previous shingles pain  Problem List Items Addressed This Visit        Other    New persistent daily headache - Primary    Relevant Orders    MRI brain and orbits wo and w contrast    Herpes zoster with questionable R optic nerve involvement 10/2007     Patient did have a previous history of questionable shingles affecting her left optic nerve  She was evaluated by Ophthalmology at the time and not felt to need chronic care  MRI of the brain is indicated with her ongoing headaches    I would like to get imaging of her periorbital regions as well         Relevant Orders    MRI brain and orbits wo and w contrast

## 2021-05-11 NOTE — TELEPHONE ENCOUNTER
OLIVIA stating that proof of four weeks of nose medicine w/o improvement was not provided  Was advised that an MRI is preferred for new onset headaches  Please advise how you would like to proceed

## 2021-05-11 NOTE — ASSESSMENT & PLAN NOTE
Patient did have a previous history of questionable shingles affecting her left optic nerve  She was evaluated by Ophthalmology at the time and not felt to need chronic care  MRI of the brain is indicated with her ongoing headaches    I would like to get imaging of her periorbital regions as well

## 2021-05-11 NOTE — TELEPHONE ENCOUNTER
Patient advised that MRI would be Insurance preference  She prefers CT as she does not want to sit for MRI  Advised patient that the information the insurance was waiting on was proof of failed treatment x 4 weeks  Patient states that she had been prescribed a zpak in April, and prior to that on March 30 her ophthalmologist had tried giving her sunny-poly-dexamethasone eye drops  The headaches remain a daily issue  Can you amend note or create new encounter with this information and we can try faxing updated clinicals  If that does not work, you would need [id number]  The actual letter states the following: Please advise

## 2021-05-11 NOTE — TELEPHONE ENCOUNTER
Sent [employer] message to advise authorization for MRI is started  Phone did not have voicemail option

## 2021-05-14 DIAGNOSIS — G44.52 NEW PERSISTENT DAILY HEADACHE: Primary | ICD-10-CM

## 2021-05-14 NOTE — PROGRESS NOTES
I spoke with someone at the approval facility for MRIs for her insurance  They are willing to approve an MRI of her brain but not or buts  This will be ordered and we can take it from there

## 2021-05-17 ENCOUNTER — HOSPITAL ENCOUNTER (OUTPATIENT)
Dept: RADIOLOGY | Facility: HOSPITAL | Age: 51
Discharge: HOME | End: 2021-05-17
Payer: COMMERCIAL

## 2021-05-17 DIAGNOSIS — G44.52 NEW PERSISTENT DAILY HEADACHE: ICD-10-CM

## 2021-05-17 PROCEDURE — 70551 MRI BRAIN STEM W/O DYE: CPT

## 2021-05-17 PROCEDURE — G1004 CDSM NDSC: HCPCS

## 2021-06-24 ENCOUNTER — VBI (OUTPATIENT)
Dept: ADMINISTRATIVE | Facility: OTHER | Age: 51
End: 2021-06-24

## 2021-07-29 ENCOUNTER — HOSPITAL ENCOUNTER (OUTPATIENT)
Dept: MRI IMAGING | Facility: HOSPITAL | Age: 51
Discharge: HOME | End: 2021-07-29
Payer: COMMERCIAL

## 2021-07-29 DIAGNOSIS — M54.42 CHRONIC LEFT-SIDED LOW BACK PAIN WITH LEFT-SIDED SCIATICA: Primary | ICD-10-CM

## 2021-07-29 DIAGNOSIS — G89.29 CHRONIC LEFT-SIDED LOW BACK PAIN WITH LEFT-SIDED SCIATICA: ICD-10-CM

## 2021-07-29 DIAGNOSIS — M54.42 CHRONIC LEFT-SIDED LOW BACK PAIN WITH LEFT-SIDED SCIATICA: ICD-10-CM

## 2021-07-29 DIAGNOSIS — G89.29 CHRONIC LEFT-SIDED LOW BACK PAIN WITH LEFT-SIDED SCIATICA: Primary | ICD-10-CM

## 2021-07-29 DIAGNOSIS — R29.898 LEFT LEG WEAKNESS: ICD-10-CM

## 2021-07-29 PROCEDURE — G1004 CDSM NDSC: HCPCS

## 2021-07-29 PROCEDURE — 72148 MRI LUMBAR SPINE W/O DYE: CPT

## 2021-07-29 NOTE — PROGRESS NOTES
I received a call from the patient's   The patient is experiencing significant increasing low back pain over the past 3 weeks  The pain is now radiating down the posterior aspect of her left leg and causing some weakness in the left leg noted upon ambulation  The patient has been trying high-dose anti-inflammatories as well as aggressive stretching techniques  Based on the patient's progressive symptoms and weakness of the leg, I am going to send her for a stat MRI

## 2021-09-09 ENCOUNTER — PROBLEM (OUTPATIENT)
Dept: URBAN - METROPOLITAN AREA CLINIC 6 | Facility: CLINIC | Age: 51
End: 2021-09-09

## 2021-09-09 DIAGNOSIS — H57.12: ICD-10-CM

## 2021-09-09 DIAGNOSIS — H52.13: ICD-10-CM

## 2021-09-09 PROCEDURE — 92014 COMPRE OPH EXAM EST PT 1/>: CPT

## 2021-09-09 PROCEDURE — 1036F TOBACCO NON-USER: CPT

## 2021-09-09 ASSESSMENT — TONOMETRY
OD_IOP_MMHG: 10
OS_IOP_MMHG: 12

## 2021-09-09 ASSESSMENT — VISUAL ACUITY
OS_SC: 20/40+0
OS_PH: 20/25
OD_PH: 20/25
OD_SC: 20/40

## 2022-02-16 ENCOUNTER — CONSULT (OUTPATIENT)
Dept: SURGERY | Facility: CLINIC | Age: 52
End: 2022-02-16
Payer: COMMERCIAL

## 2022-02-16 VITALS — WEIGHT: 122 LBS | TEMPERATURE: 97.8 F | HEIGHT: 66 IN | BODY MASS INDEX: 19.61 KG/M2

## 2022-02-16 DIAGNOSIS — D17.1 LIPOMA OF ABDOMINAL WALL: ICD-10-CM

## 2022-02-16 DIAGNOSIS — K42.9 UMBILICAL HERNIA WITHOUT OBSTRUCTION AND WITHOUT GANGRENE: Primary | ICD-10-CM

## 2022-02-16 PROCEDURE — 3008F BODY MASS INDEX DOCD: CPT | Performed by: SURGERY

## 2022-02-16 PROCEDURE — 99213 OFFICE O/P EST LOW 20 MIN: CPT | Performed by: SURGERY

## 2022-02-16 PROCEDURE — 1036F TOBACCO NON-USER: CPT | Performed by: SURGERY

## 2022-02-16 NOTE — PROGRESS NOTES
Assessment/Plan:    Umbilical hernia without obstruction and without gangrene    Lipoma of abdominal wall    - schedule for removal of abdominal wall lipoma and repair of umbilical hernia    - consent on chart    Subjective:      Patient ID: Esau Abdi is a 46 y o  female with hx of bulge near umbilicus  Pt says when she exercises the mass protrudes and causes some discomfort that is dull and non-radiating  Denies changes in bowel or bladder habits  Does not feel symptoms are related to menses  HPI    The following portions of the patient's history were reviewed and updated as appropriate: allergies, current medications, past family history, past medical history, past social history, past surgical history and problem list     Review of Systems   Constitutional: Negative  HENT: Negative  Eyes: Negative  Respiratory: Negative  Cardiovascular: Negative  Gastrointestinal:        As noted in HPI   Endocrine: Negative  Genitourinary: Negative  Musculoskeletal: Negative  Skin: Negative  Neurological: Negative  Objective:      Temp 97 8 °F (36 6 °C) (Temporal)   Ht 5' 6" (1 676 m)   Wt 55 3 kg (122 lb)   BMI 19 69 kg/m²          Physical Exam  Constitutional:       Appearance: Normal appearance  HENT:      Head: Normocephalic  Right Ear: External ear normal       Left Ear: External ear normal       Nose: Nose normal  No congestion  Eyes:      General:         Right eye: No discharge  Left eye: No discharge  Extraocular Movements: Extraocular movements intact  Pupils: Pupils are equal, round, and reactive to light  Cardiovascular:      Rate and Rhythm: Normal rate and regular rhythm  Heart sounds: Normal heart sounds  Pulmonary:      Effort: Pulmonary effort is normal  No respiratory distress  Breath sounds: No stridor  No wheezing  Abdominal:      General: There is no distension  Palpations: There is mass  Tenderness:  There is no guarding or rebound  Hernia: A hernia is present  Comments: Mobile rubbery 2-3 cm mass just above umbilicus  1-2  Cm reducible, non-tender umbilical hernia   Musculoskeletal:         General: No swelling  Normal range of motion  Cervical back: Normal range of motion  Skin:     General: Skin is warm  Coloration: Skin is not jaundiced  Neurological:      General: No focal deficit present  Mental Status: She is alert and oriented to person, place, and time  Mental status is at baseline  Psychiatric:         Mood and Affect: Mood normal          Behavior: Behavior normal          Thought Content:  Thought content normal          Judgment: Judgment normal

## 2022-02-16 NOTE — H&P
Assessment/Plan:    Umbilical hernia without obstruction and without gangrene    Lipoma of abdominal wall    - schedule for removal of abdominal wall lipoma and repair of umbilical hernia    - consent on chart    Subjective:      Patient ID: Holly Chawla is a 46 y o  female with hx of bulge near umbilicus  Pt says when she exercises the mass protrudes and causes some discomfort that is dull and non-radiating  Denies changes in bowel or bladder habits  Does not feel symptoms are related to menses  HPI    The following portions of the patient's history were reviewed and updated as appropriate: allergies, current medications, past family history, past medical history, past social history, past surgical history and problem list     Review of Systems   Constitutional: Negative  HENT: Negative  Eyes: Negative  Respiratory: Negative  Cardiovascular: Negative  Gastrointestinal:        As noted in HPI   Endocrine: Negative  Genitourinary: Negative  Musculoskeletal: Negative  Skin: Negative  Neurological: Negative  Objective:      Temp 97 8 °F (36 6 °C) (Temporal)   Ht 5' 6" (1 676 m)   Wt 55 3 kg (122 lb)   BMI 19 69 kg/m²          Physical Exam  Constitutional:       Appearance: Normal appearance  HENT:      Head: Normocephalic  Right Ear: External ear normal       Left Ear: External ear normal       Nose: Nose normal  No congestion  Eyes:      General:         Right eye: No discharge  Left eye: No discharge  Extraocular Movements: Extraocular movements intact  Pupils: Pupils are equal, round, and reactive to light  Cardiovascular:      Rate and Rhythm: Normal rate and regular rhythm  Heart sounds: Normal heart sounds  Pulmonary:      Effort: Pulmonary effort is normal  No respiratory distress  Breath sounds: No stridor  No wheezing  Abdominal:      General: There is no distension  Palpations: There is mass  Tenderness:  There is no guarding or rebound  Hernia: A hernia is present  Comments: Mobile rubbery 2-3 cm mass just above umbilicus  1-2  Cm reducible, non-tender umbilical hernia   Musculoskeletal:         General: No swelling  Normal range of motion  Cervical back: Normal range of motion  Skin:     General: Skin is warm  Coloration: Skin is not jaundiced  Neurological:      General: No focal deficit present  Mental Status: She is alert and oriented to person, place, and time  Mental status is at baseline  Psychiatric:         Mood and Affect: Mood normal          Behavior: Behavior normal          Thought Content:  Thought content normal          Judgment: Judgment normal

## 2022-03-22 LAB
ALBUMIN SERPL-MCNC: 4.2 G/DL (ref 3.6–5.1)
ALBUMIN/GLOB SERPL: 1.8 (CALC) (ref 1–2.5)
ALP SERPL-CCNC: 45 U/L (ref 37–153)
ALT SERPL-CCNC: 20 U/L (ref 6–29)
AST SERPL-CCNC: 26 U/L (ref 10–35)
BASOPHILS # BLD AUTO: 48 CELLS/UL (ref 0–200)
BASOPHILS NFR BLD AUTO: 0.7 %
BILIRUB SERPL-MCNC: 0.8 MG/DL (ref 0.2–1.2)
BUN SERPL-MCNC: 21 MG/DL (ref 7–25)
BUN/CREAT SERPL: NORMAL (CALC) (ref 6–22)
CALCIUM SERPL-MCNC: 9.5 MG/DL (ref 8.6–10.4)
CHLORIDE SERPL-SCNC: 105 MMOL/L (ref 98–110)
CO2 SERPL-SCNC: 29 MMOL/L (ref 20–32)
CREAT SERPL-MCNC: 0.88 MG/DL (ref 0.5–1.05)
EOSINOPHIL # BLD AUTO: 48 CELLS/UL (ref 15–500)
EOSINOPHIL NFR BLD AUTO: 0.7 %
ERYTHROCYTE [DISTWIDTH] IN BLOOD BY AUTOMATED COUNT: 11.7 % (ref 11–15)
GLOBULIN SER CALC-MCNC: 2.3 G/DL (CALC) (ref 1.9–3.7)
GLUCOSE SERPL-MCNC: 78 MG/DL (ref 65–99)
HCT VFR BLD AUTO: 40.4 % (ref 35–45)
HGB BLD-MCNC: 13.8 G/DL (ref 11.7–15.5)
LYMPHOCYTES # BLD AUTO: 2015 CELLS/UL (ref 850–3900)
LYMPHOCYTES NFR BLD AUTO: 29.2 %
MCH RBC QN AUTO: 33.3 PG (ref 27–33)
MCHC RBC AUTO-ENTMCNC: 34.2 G/DL (ref 32–36)
MCV RBC AUTO: 97.6 FL (ref 80–100)
MONOCYTES # BLD AUTO: 518 CELLS/UL (ref 200–950)
MONOCYTES NFR BLD AUTO: 7.5 %
NEUTROPHILS # BLD AUTO: 4271 CELLS/UL (ref 1500–7800)
NEUTROPHILS NFR BLD AUTO: 61.9 %
PLATELET # BLD AUTO: 200 THOUSAND/UL (ref 140–400)
PMV BLD REES-ECKER: 14.9 FL (ref 7.5–12.5)
POTASSIUM SERPL-SCNC: 4.1 MMOL/L (ref 3.5–5.3)
PROT SERPL-MCNC: 6.5 G/DL (ref 6.1–8.1)
RBC # BLD AUTO: 4.14 MILLION/UL (ref 3.8–5.1)
SL AMB EGFR AFRICAN AMERICAN: 88 ML/MIN/1.73M2
SL AMB EGFR NON AFRICAN AMERICAN: 76 ML/MIN/1.73M2
SODIUM SERPL-SCNC: 140 MMOL/L (ref 135–146)
WBC # BLD AUTO: 6.9 THOUSAND/UL (ref 3.8–10.8)

## 2022-03-25 RX ORDER — ELAGOLIX 150 MG/1
TABLET, FILM COATED ORAL
COMMUNITY
Start: 2022-03-08

## 2022-03-25 NOTE — PRE-PROCEDURE INSTRUCTIONS
Pre-Surgery Instructions:   Medication Instructions    Orilissa 150 MG TABS Hold day of surgery      My Surgical Experience    The following information was developed to assist you to prepare for your operation  What do I need to do before coming to the hospital?   Arrange for a responsible person to drive you to and from the hospital    Arrange care for your children at home  Children are not allowed in the recovery areas of the hospital   Plan to wear clothing that is easy to put on and take off  If you are having shoulder surgery, wear a shirt that buttons or zippers in the front  Bathing  o Shower the evening before and the morning of your surgery with an antibacterial soap  Please refer to the Pre Op Showering Instructions for Surgery Patients Sheet   o Remove nail polish and all body piercing jewelry  o Do not shave any body part for at least 24 hours before surgery-this includes face, arms, legs and upper body  Food  o Nothing to eat or drink after midnight the night before your surgery  This includes candy and chewing gum  o Exception: If your surgery is after 12:00pm (noon), you may have clear liquids such as 7-Up®, ginger ale, apple or cranberry juice, Jell-O®, water, or clear broth until 8:00 am  o Do not drink milk or juice with pulp on the morning before surgery  o Do not drink alcohol 24 hours before surgery  Medicine  o Follow instructions you received from your surgeon about which medicines you may take on the day of surgery  o If instructed to take medicine on the morning of surgery, take pills with just a small sip of water  Call your prescribing doctor for specific infroamtion on what to do if you take insulin    What should I bring to the hospital?    Bring:  [place] or a walker, if you have them, for foot or knee surgery   A list of the daily medicines, vitamins, minerals, herbals and nutritional supplements you take   Include the dosages of medicines and the time you take them each day   Glasses, dentures or hearing aids   Minimal clothing; you will be wearing hospital sleepwear   Photo ID; required to verify your identity   If you have a Living Will or Power of , bring a copy of the documents   If you have an ostomy, bring an extra pouch and any supplies you use    Do not bring   Medicines or inhalers   Money, valuables or jewelry    What other information should I know about the day of surgery?  Notify your surgeons if you develop a cold, sore throat, cough, fever, rash or any other illness   Report to the Ambulatory Surgical/Same Day Surgery Unit   You will be instructed to stop at Registration only if you have not been pre-registered   Inform your  fi they do not stay that they will be asked by the staff to leave a phone number where they can be reached   Be available to be reached before surgery  In the event the operating room schedule changes, you may be asked to come in earlier or later than expected    *It is important to tell your doctor and others involved in your health care if you are taking or have been taking any non-prescription drugs, vitamins, minerals, herbals or other nutritional supplements   Any of these may interact with some food or medicines and cause a reaction

## 2022-03-28 ENCOUNTER — ANESTHESIA EVENT (OUTPATIENT)
Dept: PERIOP | Facility: HOSPITAL | Age: 52
End: 2022-03-28
Payer: COMMERCIAL

## 2022-03-28 NOTE — ANESTHESIA PREPROCEDURE EVALUATION
Procedure:  REPAIR HERNIA UMBILICAL (N/A Abdomen)  EXCISION  BIOPSY LIPOMA ABDOMINAL WALL (N/A Chest)    Relevant Problems   ANESTHESIA   (+) PONV (postoperative nausea and vomiting)      CARDIO   (+) Mixed hyperlipidemia      NEURO/PSYCH   (+) New persistent daily headache     PONV (postoperative nausea and vomiting) Pt requests to be pre-medicated for severe N/V         Physical Exam    Airway    Mallampati score: II  TM Distance: >3 FB  Neck ROM: full     Dental       Cardiovascular      Pulmonary      Other Findings        Anesthesia Plan  ASA Score- 2     Anesthesia Type- general with ASA Monitors  Additional Monitors:   Airway Plan: LMA  Comment: Propofol gtt          Plan Factors-    Chart reviewed  Induction- intravenous  Postoperative Plan-     Informed Consent- Anesthetic plan and risks discussed with patient  I personally reviewed this patient with the CRNA  Discussed and agreed on the Anesthesia Plan with the CRNA  Waqar Joseph

## 2022-03-29 ENCOUNTER — ANESTHESIA (OUTPATIENT)
Dept: PERIOP | Facility: HOSPITAL | Age: 52
End: 2022-03-29
Payer: COMMERCIAL

## 2022-03-29 ENCOUNTER — HOSPITAL ENCOUNTER (OUTPATIENT)
Facility: HOSPITAL | Age: 52
Setting detail: OUTPATIENT SURGERY
Discharge: HOME | End: 2022-03-29
Attending: SURGERY | Admitting: SURGERY
Payer: COMMERCIAL

## 2022-03-29 VITALS
BODY MASS INDEX: 19.61 KG/M2 | DIASTOLIC BLOOD PRESSURE: 58 MMHG | TEMPERATURE: 96.2 F | HEIGHT: 66 IN | OXYGEN SATURATION: 99 % | RESPIRATION RATE: 16 BRPM | SYSTOLIC BLOOD PRESSURE: 100 MMHG | HEART RATE: 54 BPM | WEIGHT: 122 LBS

## 2022-03-29 DIAGNOSIS — K42.9 UMBILICAL HERNIA WITHOUT OBSTRUCTION AND WITHOUT GANGRENE: Primary | ICD-10-CM

## 2022-03-29 DIAGNOSIS — D17.1 LIPOMA OF ABDOMINAL WALL: ICD-10-CM

## 2022-03-29 PROCEDURE — 49585 PR REPAIR UMBILICAL HERN,5+Y/O,REDUC: CPT | Performed by: SURGERY

## 2022-03-29 PROCEDURE — 88304 TISSUE EXAM BY PATHOLOGIST: CPT | Performed by: PATHOLOGY

## 2022-03-29 PROCEDURE — 22902 EXC ABD LES SC < 3 CM: CPT | Performed by: SURGERY

## 2022-03-29 PROCEDURE — NC001 PR NO CHARGE: Performed by: SURGERY

## 2022-03-29 RX ORDER — FENTANYL CITRATE/PF 50 MCG/ML
50 SYRINGE (ML) INJECTION
Status: DISCONTINUED | OUTPATIENT
Start: 2022-03-29 | End: 2022-03-29 | Stop reason: HOSPADM

## 2022-03-29 RX ORDER — MAGNESIUM HYDROXIDE 1200 MG/15ML
LIQUID ORAL AS NEEDED
Status: DISCONTINUED | OUTPATIENT
Start: 2022-03-29 | End: 2022-03-29 | Stop reason: HOSPADM

## 2022-03-29 RX ORDER — SODIUM CHLORIDE, SODIUM LACTATE, POTASSIUM CHLORIDE, CALCIUM CHLORIDE 600; 310; 30; 20 MG/100ML; MG/100ML; MG/100ML; MG/100ML
100 INJECTION, SOLUTION INTRAVENOUS CONTINUOUS
Status: DISCONTINUED | OUTPATIENT
Start: 2022-03-29 | End: 2022-03-29 | Stop reason: HOSPADM

## 2022-03-29 RX ORDER — METOCLOPRAMIDE HYDROCHLORIDE 5 MG/ML
10 INJECTION INTRAMUSCULAR; INTRAVENOUS ONCE AS NEEDED
Status: DISCONTINUED | OUTPATIENT
Start: 2022-03-29 | End: 2022-03-29 | Stop reason: HOSPADM

## 2022-03-29 RX ORDER — CEFAZOLIN SODIUM 2 G/50ML
SOLUTION INTRAVENOUS AS NEEDED
Status: DISCONTINUED | OUTPATIENT
Start: 2022-03-29 | End: 2022-03-29

## 2022-03-29 RX ORDER — METOCLOPRAMIDE HYDROCHLORIDE 5 MG/ML
10 INJECTION INTRAMUSCULAR; INTRAVENOUS ONCE
Status: COMPLETED | OUTPATIENT
Start: 2022-03-29 | End: 2022-03-29

## 2022-03-29 RX ORDER — SODIUM CHLORIDE, SODIUM LACTATE, POTASSIUM CHLORIDE, CALCIUM CHLORIDE 600; 310; 30; 20 MG/100ML; MG/100ML; MG/100ML; MG/100ML
INJECTION, SOLUTION INTRAVENOUS CONTINUOUS PRN
Status: DISCONTINUED | OUTPATIENT
Start: 2022-03-29 | End: 2022-03-29

## 2022-03-29 RX ORDER — MIDAZOLAM HYDROCHLORIDE 2 MG/2ML
INJECTION, SOLUTION INTRAMUSCULAR; INTRAVENOUS AS NEEDED
Status: DISCONTINUED | OUTPATIENT
Start: 2022-03-29 | End: 2022-03-29

## 2022-03-29 RX ORDER — OXYCODONE HYDROCHLORIDE 5 MG/1
5 TABLET ORAL EVERY 4 HOURS PRN
Qty: 18 TABLET | Refills: 0 | Status: SHIPPED | OUTPATIENT
Start: 2022-03-29 | End: 2022-04-01

## 2022-03-29 RX ORDER — ONDANSETRON 2 MG/ML
4 INJECTION INTRAMUSCULAR; INTRAVENOUS ONCE
Status: COMPLETED | OUTPATIENT
Start: 2022-03-29 | End: 2022-03-29

## 2022-03-29 RX ORDER — CEFAZOLIN SODIUM 2 G/50ML
2000 SOLUTION INTRAVENOUS ONCE
Status: DISCONTINUED | OUTPATIENT
Start: 2022-03-29 | End: 2022-03-29 | Stop reason: HOSPADM

## 2022-03-29 RX ORDER — ONDANSETRON 2 MG/ML
INJECTION INTRAMUSCULAR; INTRAVENOUS AS NEEDED
Status: DISCONTINUED | OUTPATIENT
Start: 2022-03-29 | End: 2022-03-29

## 2022-03-29 RX ORDER — PROPOFOL 10 MG/ML
INJECTION, EMULSION INTRAVENOUS CONTINUOUS PRN
Status: DISCONTINUED | OUTPATIENT
Start: 2022-03-29 | End: 2022-03-29

## 2022-03-29 RX ORDER — BUPIVACAINE HYDROCHLORIDE 2.5 MG/ML
INJECTION, SOLUTION EPIDURAL; INFILTRATION; INTRACAUDAL AS NEEDED
Status: DISCONTINUED | OUTPATIENT
Start: 2022-03-29 | End: 2022-03-29 | Stop reason: HOSPADM

## 2022-03-29 RX ORDER — PROPOFOL 10 MG/ML
INJECTION, EMULSION INTRAVENOUS AS NEEDED
Status: DISCONTINUED | OUTPATIENT
Start: 2022-03-29 | End: 2022-03-29

## 2022-03-29 RX ORDER — FENTANYL CITRATE 50 UG/ML
INJECTION, SOLUTION INTRAMUSCULAR; INTRAVENOUS AS NEEDED
Status: DISCONTINUED | OUTPATIENT
Start: 2022-03-29 | End: 2022-03-29

## 2022-03-29 RX ORDER — KETOROLAC TROMETHAMINE 30 MG/ML
INJECTION, SOLUTION INTRAMUSCULAR; INTRAVENOUS AS NEEDED
Status: DISCONTINUED | OUTPATIENT
Start: 2022-03-29 | End: 2022-03-29

## 2022-03-29 RX ORDER — SODIUM CHLORIDE, SODIUM LACTATE, POTASSIUM CHLORIDE, CALCIUM CHLORIDE 600; 310; 30; 20 MG/100ML; MG/100ML; MG/100ML; MG/100ML
50 INJECTION, SOLUTION INTRAVENOUS CONTINUOUS
Status: DISCONTINUED | OUTPATIENT
Start: 2022-03-29 | End: 2022-03-29 | Stop reason: HOSPADM

## 2022-03-29 RX ORDER — ONDANSETRON 2 MG/ML
4 INJECTION INTRAMUSCULAR; INTRAVENOUS ONCE AS NEEDED
Status: DISCONTINUED | OUTPATIENT
Start: 2022-03-29 | End: 2022-03-29 | Stop reason: HOSPADM

## 2022-03-29 RX ORDER — LIDOCAINE HYDROCHLORIDE 10 MG/ML
INJECTION, SOLUTION EPIDURAL; INFILTRATION; INTRACAUDAL; PERINEURAL AS NEEDED
Status: DISCONTINUED | OUTPATIENT
Start: 2022-03-29 | End: 2022-03-29

## 2022-03-29 RX ORDER — DEXAMETHASONE SODIUM PHOSPHATE 10 MG/ML
INJECTION, SOLUTION INTRAMUSCULAR; INTRAVENOUS AS NEEDED
Status: DISCONTINUED | OUTPATIENT
Start: 2022-03-29 | End: 2022-03-29

## 2022-03-29 RX ADMIN — SODIUM CHLORIDE, SODIUM LACTATE, POTASSIUM CHLORIDE, AND CALCIUM CHLORIDE: .6; .31; .03; .02 INJECTION, SOLUTION INTRAVENOUS at 08:55

## 2022-03-29 RX ADMIN — METOCLOPRAMIDE HYDROCHLORIDE 10 MG: 5 INJECTION INTRAMUSCULAR; INTRAVENOUS at 11:03

## 2022-03-29 RX ADMIN — LIDOCAINE HYDROCHLORIDE 80 MG: 10 INJECTION, SOLUTION EPIDURAL; INFILTRATION; INTRACAUDAL; PERINEURAL at 08:03

## 2022-03-29 RX ADMIN — MIDAZOLAM 2 MG: 1 INJECTION INTRAMUSCULAR; INTRAVENOUS at 07:57

## 2022-03-29 RX ADMIN — KETOROLAC TROMETHAMINE 30 MG: 30 INJECTION, SOLUTION INTRAMUSCULAR at 08:25

## 2022-03-29 RX ADMIN — ONDANSETRON 4 MG: 2 INJECTION INTRAMUSCULAR; INTRAVENOUS at 10:33

## 2022-03-29 RX ADMIN — CEFAZOLIN SODIUM 2000 MG: 2 SOLUTION INTRAVENOUS at 08:04

## 2022-03-29 RX ADMIN — DEXAMETHASONE SODIUM PHOSPHATE 8 MG: 10 INJECTION, SOLUTION INTRAMUSCULAR; INTRAVENOUS at 08:20

## 2022-03-29 RX ADMIN — ONDANSETRON 4 MG: 2 INJECTION INTRAMUSCULAR; INTRAVENOUS at 08:20

## 2022-03-29 RX ADMIN — PROPOFOL 200 MG: 10 INJECTION, EMULSION INTRAVENOUS at 08:03

## 2022-03-29 RX ADMIN — PROPOFOL 120 MCG/KG/MIN: 10 INJECTION, EMULSION INTRAVENOUS at 08:05

## 2022-03-29 RX ADMIN — FENTANYL CITRATE 50 MCG: 50 INJECTION INTRAMUSCULAR; INTRAVENOUS at 08:17

## 2022-03-29 RX ADMIN — SODIUM CHLORIDE, SODIUM LACTATE, POTASSIUM CHLORIDE, AND CALCIUM CHLORIDE: .6; .31; .03; .02 INJECTION, SOLUTION INTRAVENOUS at 07:30

## 2022-03-29 RX ADMIN — FENTANYL CITRATE 50 MCG: 50 INJECTION INTRAMUSCULAR; INTRAVENOUS at 08:34

## 2022-03-29 NOTE — OP NOTE
OPERATIVE REPORT  PATIENT NAME: Kin Mcdonald    :  1970  MRN: [id number]  Pt Location: [facility] OR ROOM 08    SURGERY DATE: 3/29/2022    Surgeon(s) and Role:     * Venice Mcghee MD - Primary     * Jake Solares DO - Assisting     * Arelia Alpers, MD - Assisting    Preop Diagnosis:  Umbilical hernia without obstruction and without gangrene [K42 9]  Lipoma of abdominal wall [D17 1]    Post-Op Diagnosis Codes:     * Umbilical hernia without obstruction and without gangrene [K42 9]     * Lipoma of abdominal wall [D17 1]    Procedure(s) (LRB):  REPAIR HERNIA UMBILICAL (N/A)  REPAIR HERNIA VENTRAL (N/A)    Specimen(s):  ID Type Source Tests Collected by Time Destination   1 : ABDOMINAL WALL LIPOMA Tissue Soft Tissue, Lipoma TISSUE EXAM Venice Mcghee MD 3/29/2022 [id number]        Estimated Blood Loss:   Minimal    Drains:  * No LDAs found *    Anesthesia Type:   General    Operative Indications:  Umbilical hernia without obstruction and without gangrene [K42 9]  Lipoma of abdominal wall [D17 1]      Operative Findings:  0 5 cm umbilical hernia  1 cm fat containing ventral hernia approximately 5 cm superior to umbilicus    Complications:   None    Procedure and Technique:  Patient was transported to the operating room and placed in the supine position on the operating room table  General anesthesia was induced and an LMA was established in her airway  The patient's abdomen was prepped and draped in the usual sterile fashion  A time-out was performed to confirm the correct patient, procedure, location, and appropriate sterile indicators  Attention was turned to the abdomen where a less than 1 cm umbilical hernia was appreciated  There was also a rubbery 2 cm mass approximately 5 cm superior to this along the midline, which felt consistent with a lipoma      A 3 cm supraumbilical curvilinear incision was made with a 15 blade scalpel after the administration of local anesthetic into the skin and subcutaneous tissue  Dissection was taken down through the subcutaneous tissue with Bovie electrocautery to the level of the fascia  A 0 5 cm fascial defect was noted at the base of the umbilical stalk  This was repaired with 2-0 PDS suture in a figure-of-eight fashion  Approximately 5 cm superior to this, there was a 2 cm x 3 cm fatty mass that was protruding through a 1 cm fascial defect, consistent with a ventral hernia  This fatty mass was consistent with preperitoneal fat  Given the small size of the fascial defect, the decision was made to clamp, tie and amputate the mass at the level of the fascia  The fatty mass had ongoing bleeding which was controlled with suture ligation and then reduced through the 1 cm fascial defect  The fascial defect was repaired with 2-0 PDS suture in a figure-of-eight fashion  Hemostasis was achieved with Bovie electrocautery  The wound was then irrigated with warmed normal saline  The skin was then closed with 4-0 Monocryl in a running subcuticular fashion  The incision was covered with benzoin and a steri strip  All counts were correct at the end the case  The patient was allowed to wake up per Anesthesia and the LMA was removed without incident  Patient was transported to PACU in stable condition  Dr Cris Piña was present and scrubbed for the entire procedure      Patient Disposition:  PACU       SIGNATURE: Tanvir Thao DO  DATE: March 29, 2022  TIME: 9:27 AM

## 2022-03-29 NOTE — DISCHARGE INSTRUCTIONS
Umbilical Hernia Repair   WHAT YOU NEED TO KNOW:   An umbilical hernia repair is surgery to fix your umbilical (belly button) hernia  Your hernia may be fixed with an open or laparoscopic surgery  You may have pain, bloating, or nausea after your surgery  If you had a laparoscopic repair, you may have pain in your shoulder or near your ribs  This is from the gas used during surgery  DISCHARGE INSTRUCTIONS:   Seek care if  · You feel lightheaded, short of breath, and have chest pain  · You cough up blood  · Your arm or leg feels warm, tender, and painful  It may look swollen and red  · Blood soaks through your bandage  · Your abdomen feels hard and looks bigger than usual      · Your bowel movements are black, bloody, or look like tar  · You have severe abdominal pain  · You have a fever  · Your pain does not get better after you take pain medicine  · You develop a skin rash, hives, or itching  · Your incision is swollen, red, or draining pus or fluid  · You have nausea or are vomiting  · You do not have a bowel movement for 3 days or more  · You have questions or concerns about your condition or care  Medicines:   · Prescription pain medicine  may be given  Ask your healthcare provider how to take this medicine safely  Some prescription pain medicines contain acetaminophen  Do not take other medicines that contain acetaminophen without talking to your healthcare provider  Too much acetaminophen may cause liver damage  Prescription pain medicine may cause constipation  Ask your healthcare provider how to prevent or treat constipation  · Take your medicine as directed  Contact your healthcare provider if you think your medicine is not helping or if you have side effects  Tell him of her if you are allergic to any medicine  Keep a list of the medicines, vitamins, and herbs you take  Include the amounts, and when and why you take them   Bring the list or the pill bottles to follow-up visits  Carry your medicine list with you in case of an emergency  Bathing: You can shower 48 hours after your surgery or as directed  Do not take a bath or go in hot tubs  This can cause an infection  Wash around your incision  Let soap and water run over your incision  Gently pat the area dry or let it air dry  Care for your incision as directed:  Keep your incision clean and dry  If you have Steri-strips over your incision, allow them to fall off on their own  If they do not fall off after 2 weeks, gently peel them off  Do not put powders or lotions on your incision  Check your incision every day for signs of infection, such as redness, swelling, or pus  Self-care:   · Eat high-fiber foods  Fiber may prevent constipation and straining during a bowel movement  This can prevent your hernia from returning  Foods that contain fiber include fruits, vegetables, legumes, and whole grains  You may need to take over-the-counter fiber supplements if you do not get enough fiber in your diet  Ask your healthcare provider if supplements are right for you  · Drink plenty of liquids  Liquids may prevent constipation and straining during a bowel movement  This will help prevent pressure on your incision  It may also prevent another hernia  Ask how much liquid you should drink each day and which liquids are best for you  · Apply ice  on your incision for 15 to 20 minutes every hour or as directed  Use an ice pack, or put crushed ice in a plastic bag  Cover it with a towel before you apply it to your skin  Ice helps prevent tissue damage and decreases swelling and pain  Activity:  Take short walks around the house every hour  This will help prevent blood clots  Slowly return to your normal activities  Do not play sports or lift anything heavier than 10 pounds for 4 to 6 weeks or as directed  Ask your healthcare provider when you can return to work and your usual activities     Follow up with your healthcare provider as directed:  Write down your questions so you remember to ask them during your visits  © Copyright [employer] 2022 Information is for End User's use only and may not be sold, redistributed or otherwise used for commercial purposes  All illustrations and images included in CareNotes® are the copyrighted property of A D A M , Inc  or Dayana Gordon   The above information is an  only  It is not intended as medical advice for individual conditions or treatments  Talk to your doctor, nurse or pharmacist before following any medical regimen to see if it is safe and effective for you

## 2022-03-29 NOTE — ANESTHESIA POSTPROCEDURE EVALUATION
Post-Op Assessment Note    CV Status:  Stable    Pain management: adequate     Mental Status:  Sleepy and awake   Hydration Status:  Euvolemic   PONV Controlled:  Controlled   Airway Patency:  Patent      Post Op Vitals Reviewed: Yes      Staff: CRNA         No complications documented      BP (P) 116/74 (03/29/22 0924)    Temp (P) 98 3 °F (36 8 °C) (03/29/22 0924)    Pulse (!) (P) 53 (03/29/22 0924)   Resp (P) 17 (03/29/22 0924)    SpO2 (P) 98 % (03/29/22 0924)

## 2022-03-29 NOTE — H&P
Assessment/Plan:     Umbilical hernia without obstruction and without gangrene     Lipoma of abdominal wall    - for removal of abdominal wall lipoma and repair of umbilical hernia    - consent on chart     Subjective:       Patient ID: Yanique Gonzalez is a 46 y o  female with hx of bulge near umbilicus  Pt says when she exercises the mass protrudes and causes some discomfort that is dull and non-radiating  Denies changes in bowel or bladder habits  Does not feel symptoms are related to menses       HPI     The following portions of the patient's history were reviewed and updated as appropriate: allergies, current medications, past family history, past medical history, past social history, past surgical history and problem list      Review of Systems   Constitutional: Negative  HENT: Negative  Eyes: Negative  Respiratory: Negative  Cardiovascular: Negative  Gastrointestinal:        As noted in HPI   Endocrine: Negative  Genitourinary: Negative  Musculoskeletal: Negative  Skin: Negative  Neurological: Negative            Objective:        Temp 97 8 °F (36 6 °C) (Temporal)   Ht 5' 6" (1 676 m)   Wt 55 3 kg (122 lb)   BMI 19 69 kg/m²             Physical Exam  Constitutional:       Appearance: Normal appearance  HENT:      Head: Normocephalic  Right Ear: External ear normal       Left Ear: External ear normal       Nose: Nose normal  No congestion  Eyes:      General:         Right eye: No discharge  Left eye: No discharge  Extraocular Movements: Extraocular movements intact  Pupils: Pupils are equal, round, and reactive to light  Cardiovascular:      Rate and Rhythm: Normal rate and regular rhythm  Heart sounds: Normal heart sounds  Pulmonary:      Effort: Pulmonary effort is normal  No respiratory distress  Breath sounds: No stridor  No wheezing  Abdominal:      General: There is no distension  Palpations: There is mass        Tenderness: There is no guarding or rebound  Hernia: A hernia is present  Comments: Mobile rubbery 2-3 cm mass just above umbilicus  1-2  Cm reducible, non-tender umbilical hernia   Musculoskeletal:         General: No swelling  Normal range of motion  Cervical back: Normal range of motion  Skin:     General: Skin is warm  Coloration: Skin is not jaundiced  Neurological:      General: No focal deficit present  Mental Status: She is alert and oriented to person, place, and time  Mental status is at baseline  Psychiatric:         Mood and Affect: Mood normal          Behavior: Behavior normal          Thought Content: Thought content normal          Judgment: Judgment normal                         Progress Notes  Alberto Gusman MD (Physician)   General Surgery  Assessment/Plan:     Umbilical hernia without obstruction and without gangrene     Lipoma of abdominal wall    - schedule for removal of abdominal wall lipoma and repair of umbilical hernia    - consent on chart     Subjective:       Patient ID: Jessica Calvillo is a 46 y o  female with hx of bulge near umbilicus  Pt says when she exercises the mass protrudes and causes some discomfort that is dull and non-radiating  Denies changes in bowel or bladder habits  Does not feel symptoms are related to menses       HPI     The following portions of the patient's history were reviewed and updated as appropriate: allergies, current medications, past family history, past medical history, past social history, past surgical history and problem list      Review of Systems   Constitutional: Negative  HENT: Negative  Eyes: Negative  Respiratory: Negative  Cardiovascular: Negative  Gastrointestinal:        As noted in HPI   Endocrine: Negative  Genitourinary: Negative  Musculoskeletal: Negative  Skin: Negative      Neurological: Negative            Objective:        Temp 97 8 °F (36 6 °C) (Temporal)   Ht 5' 6" (1 676 m)   Wt 55 3 kg (122 lb)   BMI 19 69 kg/m²             Physical Exam  Constitutional:       Appearance: Normal appearance  HENT:      Head: Normocephalic  Right Ear: External ear normal       Left Ear: External ear normal       Nose: Nose normal  No congestion  Eyes:      General:         Right eye: No discharge  Left eye: No discharge  Extraocular Movements: Extraocular movements intact  Pupils: Pupils are equal, round, and reactive to light  Cardiovascular:      Rate and Rhythm: Normal rate and regular rhythm  Heart sounds: Normal heart sounds  Pulmonary:      Effort: Pulmonary effort is normal  No respiratory distress  Breath sounds: No stridor  No wheezing  Abdominal:      General: There is no distension  Palpations: There is mass  Tenderness: There is no guarding or rebound  Hernia: A hernia is present  Comments: Mobile rubbery 2-3 cm mass just above umbilicus  1-2  Cm reducible, non-tender umbilical hernia   Musculoskeletal:         General: No swelling  Normal range of motion  Cervical back: Normal range of motion  Skin:     General: Skin is warm  Coloration: Skin is not jaundiced  Neurological:      General: No focal deficit present  Mental Status: She is alert and oriented to person, place, and time  Mental status is at baseline  Psychiatric:         Mood and Affect: Mood normal          Behavior: Behavior normal          Thought Content:  Thought content normal          Judgment: Judgment normal

## 2022-04-11 ENCOUNTER — OFFICE VISIT (OUTPATIENT)
Dept: SURGERY | Facility: CLINIC | Age: 52
End: 2022-04-11

## 2022-04-11 VITALS — HEIGHT: 66 IN | TEMPERATURE: 97.7 F | WEIGHT: 122 LBS | BODY MASS INDEX: 19.61 KG/M2

## 2022-04-11 DIAGNOSIS — Z09 STATUS POST UMBILICAL HERNIA REPAIR, FOLLOW-UP EXAM: Primary | ICD-10-CM

## 2022-04-11 PROCEDURE — 99024 POSTOP FOLLOW-UP VISIT: CPT | Performed by: SURGERY

## 2022-04-11 NOTE — PROGRESS NOTES
Office Visit - General Surgery  Kat Mehta MRN: [id number]  Encounter: [id number]    Assessment and Plan  Umbilical Hernia repair - doing well  All restrictions reviewed  Follow up PRN    Chief Complaint:  Kat Mehta is a 46 y o  female who presents for Hernia (p/o umbilical hernia repair )    Subjective  Pt doing well  No complaints      Past Medical History:   Diagnosis Date    Cardiac murmur     COVID 01/2022    early January    Kidney stone     Known health problems: none     Low blood pressure     PONV (postoperative nausea and vomiting)     Pt requests to be pre-medicated for severe N/V    Shingles     Stress fracture of right fibula     Viral meningitis        Past Surgical History:   Procedure Laterality Date    BREAST BIOPSY      in the past 8 years not sure which breast /patient    HAND SURGERY Left     INCISIONAL BREAST BIOPSY  2016    IN HYSTEROSCOPY,W/ENDO BX N/A 11/19/2020    Procedure: D&C, HYSTEROSCOPY;  Surgeon: Vahid Landry DO;  [facility];  Service: Gynecology    IN HYSTEROSCOPY,W/ENDOMETRIAL ABLATION N/A 11/19/2020    Procedure: ABLATION;  Surgeon: Vahid Landry DO;  [facility];  Service: Gynecology    IN REPAIR UMBILICAL BCPO,1+O/B,HRGRA N/A 3/29/2022    Procedure: REPAIR HERNIA UMBILICAL;  Surgeon: Celena Boeck, MD;  [facility];  Service: General    TONSILLECTOMY      VENTRAL HERNIA REPAIR N/A 3/29/2022    Procedure: REPAIR HERNIA VENTRAL;  Surgeon: Celena Boeck, MD;  [facility];  Service: General    WISDOM TOOTH EXTRACTION         Family History   Problem Relation Age of Onset    Arrhythmia Mother     Hypertension Mother     Suicide Attempts Father     Suicide Attempts Brother     No Known Problems Daughter     No Known Problems Daughter        Social History     Tobacco Use    Smoking status: Never Smoker    Smokeless tobacco: Never Used   Vaping Use    Vaping Use: Never used   Substance Use Topics    Alcohol use: Yes     Comment: rare    Drug use: Never     Comment: No use        Medications  Current Outpatient Medications on File Prior to Visit   Medication Sig Dispense Refill    Orilissa 150 MG TABS        No current facility-administered medications on file prior to visit  Allergies  No Known Allergies    Review of Systems   Constitutional: Negative  Gastrointestinal: Negative  Objective  Vitals:    04/11/22 0809   Temp: 97 7 °F (36 5 °C)       Physical Exam  Abdominal:      General: Abdomen is flat  Palpations: Abdomen is soft  There is no mass  Hernia: No hernia is present        Comments: Wound well healed

## 2022-04-26 ENCOUNTER — HOSPITAL ENCOUNTER (OUTPATIENT)
Dept: MAMMOGRAPHY | Facility: CLINIC | Age: 52
Discharge: HOME | End: 2022-04-26
Payer: COMMERCIAL

## 2022-04-26 ENCOUNTER — HOSPITAL ENCOUNTER (OUTPATIENT)
Dept: ULTRASOUND IMAGING | Facility: CLINIC | Age: 52
Discharge: HOME | End: 2022-04-26
Payer: COMMERCIAL

## 2022-04-26 VITALS — HEIGHT: 66 IN | WEIGHT: 121.25 LBS | BODY MASS INDEX: 19.49 KG/M2

## 2022-04-26 VITALS — WEIGHT: 122 LBS | HEIGHT: 66 IN | BODY MASS INDEX: 19.61 KG/M2

## 2022-04-26 DIAGNOSIS — Z12.39 ENCOUNTER FOR OTHER SCREENING FOR MALIGNANT NEOPLASM OF BREAST: ICD-10-CM

## 2022-04-26 DIAGNOSIS — Z12.31 ENCOUNTER FOR SCREENING MAMMOGRAM FOR MALIGNANT NEOPLASM OF BREAST: ICD-10-CM

## 2022-04-26 PROCEDURE — 77063 BREAST TOMOSYNTHESIS BI: CPT

## 2022-04-26 PROCEDURE — 77067 SCR MAMMO BI INCL CAD: CPT

## 2022-04-26 PROCEDURE — 76641 ULTRASOUND BREAST COMPLETE: CPT

## 2022-04-28 ENCOUNTER — HOSPITAL ENCOUNTER (OUTPATIENT)
Dept: ULTRASOUND IMAGING | Facility: CLINIC | Age: 52
Discharge: HOME | End: 2022-04-28
Payer: COMMERCIAL

## 2022-04-28 ENCOUNTER — HOSPITAL ENCOUNTER (OUTPATIENT)
Dept: MAMMOGRAPHY | Facility: CLINIC | Age: 52
Discharge: HOME | End: 2022-04-28
Payer: COMMERCIAL

## 2022-04-28 VITALS — HEIGHT: 66 IN | BODY MASS INDEX: 19.44 KG/M2 | WEIGHT: 121 LBS

## 2022-04-28 DIAGNOSIS — R92.8 ABNORMAL MAMMOGRAM: ICD-10-CM

## 2022-04-28 PROCEDURE — 76642 ULTRASOUND BREAST LIMITED: CPT

## 2022-04-28 PROCEDURE — G0279 TOMOSYNTHESIS, MAMMO: HCPCS

## 2022-04-28 PROCEDURE — 77065 DX MAMMO INCL CAD UNI: CPT

## 2022-09-06 ENCOUNTER — ANESTHESIA EVENT (OUTPATIENT)
Dept: ANESTHESIOLOGY | Facility: HOSPITAL | Age: 52
End: 2022-09-06

## 2022-09-06 ENCOUNTER — ANESTHESIA (OUTPATIENT)
Dept: ANESTHESIOLOGY | Facility: HOSPITAL | Age: 52
End: 2022-09-06

## 2022-09-06 NOTE — ANESTHESIA PREPROCEDURE EVALUATION
Procedure:  PRE-OP ONLY    Relevant Problems   ANESTHESIA   (+) PONV (postoperative nausea and vomiting)      CARDIO   (+) Mixed hyperlipidemia      NEURO/PSYCH   (+) New persistent daily headache

## 2022-09-07 ENCOUNTER — HOSPITAL ENCOUNTER (OUTPATIENT)
Dept: GASTROENTEROLOGY | Facility: HOSPITAL | Age: 52
Setting detail: OUTPATIENT SURGERY
Discharge: HOME | End: 2022-09-07
Attending: COLON & RECTAL SURGERY
Payer: COMMERCIAL

## 2022-09-07 ENCOUNTER — ANESTHESIA (OUTPATIENT)
Dept: GASTROENTEROLOGY | Facility: HOSPITAL | Age: 52
End: 2022-09-07

## 2022-09-07 ENCOUNTER — ANESTHESIA EVENT (OUTPATIENT)
Dept: GASTROENTEROLOGY | Facility: HOSPITAL | Age: 52
End: 2022-09-07

## 2022-09-07 VITALS
WEIGHT: 124 LBS | SYSTOLIC BLOOD PRESSURE: 110 MMHG | OXYGEN SATURATION: 99 % | HEIGHT: 66 IN | DIASTOLIC BLOOD PRESSURE: 64 MMHG | BODY MASS INDEX: 19.93 KG/M2 | HEART RATE: 57 BPM | TEMPERATURE: 97.5 F | RESPIRATION RATE: 18 BRPM

## 2022-09-07 DIAGNOSIS — Z12.11 ENCOUNTER FOR SCREENING COLONOSCOPY: ICD-10-CM

## 2022-09-07 PROCEDURE — G0121 COLON CA SCRN NOT HI RSK IND: HCPCS | Performed by: COLON & RECTAL SURGERY

## 2022-09-07 RX ORDER — METRONIDAZOLE 7.5 MG/G
GEL VAGINAL
COMMUNITY
Start: 2022-09-04

## 2022-09-07 RX ORDER — PROPOFOL 10 MG/ML
INJECTION, EMULSION INTRAVENOUS CONTINUOUS PRN
Status: DISCONTINUED | OUTPATIENT
Start: 2022-09-07 | End: 2022-09-07

## 2022-09-07 RX ORDER — LIDOCAINE HYDROCHLORIDE 10 MG/ML
INJECTION, SOLUTION EPIDURAL; INFILTRATION; INTRACAUDAL; PERINEURAL AS NEEDED
Status: DISCONTINUED | OUTPATIENT
Start: 2022-09-07 | End: 2022-09-07

## 2022-09-07 RX ORDER — PROPOFOL 10 MG/ML
INJECTION, EMULSION INTRAVENOUS AS NEEDED
Status: DISCONTINUED | OUTPATIENT
Start: 2022-09-07 | End: 2022-09-07

## 2022-09-07 RX ORDER — ONDANSETRON 2 MG/ML
INJECTION INTRAMUSCULAR; INTRAVENOUS AS NEEDED
Status: DISCONTINUED | OUTPATIENT
Start: 2022-09-07 | End: 2022-09-07

## 2022-09-07 RX ORDER — SODIUM CHLORIDE, SODIUM LACTATE, POTASSIUM CHLORIDE, CALCIUM CHLORIDE 600; 310; 30; 20 MG/100ML; MG/100ML; MG/100ML; MG/100ML
INJECTION, SOLUTION INTRAVENOUS CONTINUOUS PRN
Status: DISCONTINUED | OUTPATIENT
Start: 2022-09-07 | End: 2022-09-07

## 2022-09-07 RX ADMIN — PROPOFOL 80 MG: 10 INJECTION, EMULSION INTRAVENOUS at 08:04

## 2022-09-07 RX ADMIN — PROPOFOL 40 MG: 10 INJECTION, EMULSION INTRAVENOUS at 08:05

## 2022-09-07 RX ADMIN — SODIUM CHLORIDE, SODIUM LACTATE, POTASSIUM CHLORIDE, AND CALCIUM CHLORIDE: .6; .31; .03; .02 INJECTION, SOLUTION INTRAVENOUS at 07:50

## 2022-09-07 RX ADMIN — PROPOFOL 120 MCG/KG/MIN: 10 INJECTION, EMULSION INTRAVENOUS at 08:04

## 2022-09-07 RX ADMIN — LIDOCAINE HYDROCHLORIDE 50 MG: 10 INJECTION, SOLUTION EPIDURAL; INFILTRATION; INTRACAUDAL; PERINEURAL at 08:04

## 2022-09-07 RX ADMIN — PROPOFOL 30 MG: 10 INJECTION, EMULSION INTRAVENOUS at 08:10

## 2022-09-07 RX ADMIN — ONDANSETRON 4 MG: 2 INJECTION INTRAMUSCULAR; INTRAVENOUS at 08:02

## 2022-09-07 NOTE — ANESTHESIA PREPROCEDURE EVALUATION
Procedure:  COLONOSCOPY    Relevant Problems   ANESTHESIA   (+) PONV (postoperative nausea and vomiting)      CARDIO   (+) Mixed hyperlipidemia      NEURO/PSYCH   (+) New persistent daily headache      Recent labs personally reviewed:  Lab Results   Component Value Date    WBC 6 9 03/22/2022    HGB 13 8 03/22/2022     03/22/2022     Lab Results   Component Value Date    K 4 1 03/22/2022    BUN 21 03/22/2022    CREATININE 0 88 03/22/2022     No results found for: PTT   No results found for: INR    No results found for: HGBA1C        Physical Exam    Airway    Mallampati score: II  TM Distance: >3 FB  Neck ROM: full     Dental   No notable dental hx     Cardiovascular  Cardiovascular exam normal    Pulmonary  Pulmonary exam normal     Other Findings        Anesthesia Plan  ASA Score- 2     Anesthesia Type- IV sedation with anesthesia with ASA Monitors  Additional Monitors:   Airway Plan:     Comment: Supplemental O2, etco2 monitoring  Preop zofran    Plan Factors-Exercise tolerance (METS): >4 METS  Chart reviewed  Existing labs reviewed  Patient summary reviewed  Patient is not a current smoker  Patient not instructed to abstain from smoking on day of procedure  Patient did not smoke on day of surgery  There is medical exclusion for perioperative obstructive sleep apnea risk education  Induction- intravenous  Postoperative Plan-     Informed Consent- Anesthetic plan and risks discussed with patient  I personally reviewed this patient with the CRNA  Discussed and agreed on the Anesthesia Plan with the CRNA  Izaiah Jin

## 2022-09-07 NOTE — H&P
History and Physical   Colon and Rectal Surgery   Heidi Carmona 46 y o  female MRN: [id number]  Unit/Bed#:  Encounter: [id number]  09/07/22   7:59 AM      CC:  Screening  History of Present Illness   HPI:  Heidi Carmona is a 46 y o  female with no GI symptoms    Historical Information   Past Medical History:   Diagnosis Date    Cardiac murmur     COVID 01/2022    early January    Kidney stone     Known health problems: none     Low blood pressure     PONV (postoperative nausea and vomiting)     Pt requests to be pre-medicated for severe N/V    Shingles     Stress fracture of right fibula     Viral meningitis      Past Surgical History:   Procedure Laterality Date    BREAST BIOPSY      in the past 8 years not sure which breast /patient    HAND SURGERY Left     INCISIONAL BREAST BIOPSY  2016    [place] HYSTEROSCOPY,W/ENDO BX N/A 11/19/2020    Procedure: D&C, HYSTEROSCOPY;  Surgeon: Myriam Ponce DO;  [facility];  Service: Gynecology    KS HYSTEROSCOPY,W/ENDOMETRIAL ABLATION N/A 11/19/2020    Procedure: ABLATION;  Surgeon: Myriam Ponce DO;  [facility];  Service: Gynecology    KS REPAIR UMBILICAL BAME,6+D/B,VRCXU N/A 3/29/2022    Procedure: REPAIR HERNIA UMBILICAL;  Surgeon: Carlene Buckley MD;  [facility];  Service: General    TONSILLECTOMY      VENTRAL HERNIA REPAIR N/A 3/29/2022    Procedure: REPAIR HERNIA VENTRAL;  Surgeon: Carlene Buckley MD;  [facility];  Service: General    WISDOM TOOTH EXTRACTION         Meds/Allergies     (Not in a hospital admission)        Current Outpatient Medications:     metroNIDAZOLE (METROGEL) 0 75 % vaginal gel, INSERT ONE APPLICATORFUL VAGINALLY AT BEDTIME FOR 5 NIGHTS, Disp: , Rfl:     Orilissa 150 MG TABS, , Disp: , Rfl:     No Known Allergies      Social History   Social History     Substance and Sexual Activity   Alcohol Use Yes    Comment: rare     Social History     Substance and Sexual Activity   Drug Use Never Comment: No use     Social History     Tobacco Use   Smoking Status Never Smoker   Smokeless Tobacco Never Used         Family History:   Family History   Problem Relation Age of Onset    Arrhythmia Mother     Hypertension Mother     Suicide Attempts Father     Suicide Attempts Brother     No Known Problems Daughter     No Known Problems Daughter     No Known Problems Maternal Grandmother     No Known Problems Maternal Grandfather     No Known Problems Paternal Grandmother      Review of Systems - General ROS: negative  Respiratory ROS: negative  Cardiovascular ROS: negative     Objective     Current Vitals:   Blood Pressure: 113/61 (09/07/22 0745)  Pulse: 57 (09/07/22 0745)  Temperature: (!) 96 9 °F (36 1 °C) (09/07/22 0745)  Temp Source: Tympanic (09/07/22 0745)  Respirations: 18 (09/07/22 0745)  Height: 5' 6" (167 6 cm) (09/07/22 0745)  Weight - Scale: 56 2 kg (124 lb) (09/07/22 0745)  SpO2: 100 % (09/07/22 0745)  No intake or output data in the 24 hours ending 09/07/22 0759    Physical Exam:  General:  Well nourished, no distress  Neuro: Alert and oriented  Eyes:Sclera anicteric, conjunctiva pink  Pulm: Clear to auscultation bilaterally  No respiratory Distress  CV:  Regular rate and rhythm  No murmurs  Abdomen:  Soft, flat, non-tender, without masses or hepatosplenomegaly  Lab Results:       ASSESSMENT:  Shy Mock is a 46 y o  female for screening  PLAN:  Colonoscopy  Risks , including, but not limited to, bleeding, perforation, missed lesions, and potential need for surgery, were reviewed  Alternatives to colonoscopy were discussed    Glo Barba MD

## 2022-09-07 NOTE — ANESTHESIA POSTPROCEDURE EVALUATION
Post-Op Assessment Note    CV Status:  Stable  Pain Score: 0    Pain management: adequate     Mental Status:  Awake and sleepy   Hydration Status:  Euvolemic   PONV Controlled:  Controlled   Airway Patency:  Patent      Post Op Vitals Reviewed: Yes      Staff: CRNA, Anesthesiologist         No complications documented      /57 (09/07/22 0830)    Temp 97 5 °F (36 4 °C) (09/07/22 0830)    Pulse 58 (09/07/22 0830)   Resp 18 (09/07/22 0830)    SpO2 100 % (09/07/22 0830)

## 2022-09-14 ENCOUNTER — VBI (OUTPATIENT)
Dept: ADMINISTRATIVE | Facility: OTHER | Age: 52
End: 2022-09-14

## 2022-10-11 ENCOUNTER — CONSULT (OUTPATIENT)
Dept: INFECTIOUS DISEASES | Facility: CLINIC | Age: 52
End: 2022-10-11

## 2022-10-11 VITALS — TEMPERATURE: 97.8 F

## 2022-10-11 DIAGNOSIS — Z71.84 TRAVEL ADVICE ENCOUNTER: Primary | ICD-10-CM

## 2022-10-11 PROCEDURE — 90691 TYPHOID VACCINE IM: CPT

## 2022-10-11 PROCEDURE — 90471 IMMUNIZATION ADMIN: CPT

## 2022-10-11 NOTE — PROGRESS NOTES
[facility]    Patient is traveling to countries or areas within countries where immunizations are required or recommended:   [place]    Patient states they will visit underdeveloped areas with poor sanitation Yes/No: Yes   Patient requires malaria prophylaxis Yes/No: No    Orders Placed This Encounter   Procedures   • Typhoid VICPS Vaccine IM       Patient instructed how to take medications Yes/No: Yes  Patient warned about side effects Yes/No: Yes  Patient declined Yes/No: No

## 2022-10-24 PROBLEM — K64.0 GRADE I HEMORRHOIDS: Status: ACTIVE | Noted: 2022-10-24

## 2023-04-03 ENCOUNTER — LAB REQUISITION (OUTPATIENT)
Dept: LAB | Facility: HOSPITAL | Age: 53
End: 2023-04-03

## 2023-04-03 DIAGNOSIS — N89.8 OTHER SPECIFIED NONINFLAMMATORY DISORDERS OF VAGINA: ICD-10-CM

## 2023-04-04 LAB
HSV1 DNA SPEC QL NAA+PROBE: DETECTED
HSV1 DNA SPEC QL NAA+PROBE: NOT DETECTED

## 2023-09-13 ENCOUNTER — HOSPITAL ENCOUNTER (OUTPATIENT)
Dept: RADIOLOGY | Facility: HOSPITAL | Age: 53
Discharge: HOME | End: 2023-09-13
Payer: COMMERCIAL

## 2023-09-13 VITALS — WEIGHT: 125 LBS | HEIGHT: 66 IN | BODY MASS INDEX: 20.09 KG/M2

## 2023-09-13 DIAGNOSIS — R92.8 ABNORMAL MAMMOGRAM: ICD-10-CM

## 2023-09-13 DIAGNOSIS — Z12.31 SCREENING MAMMOGRAM, ENCOUNTER FOR: ICD-10-CM

## 2023-09-13 DIAGNOSIS — R92.2 DENSE BREAST TISSUE: ICD-10-CM

## 2023-09-13 PROCEDURE — 77067 SCR MAMMO BI INCL CAD: CPT

## 2023-09-13 PROCEDURE — 76641 ULTRASOUND BREAST COMPLETE: CPT

## 2023-09-13 PROCEDURE — 77063 BREAST TOMOSYNTHESIS BI: CPT

## 2023-09-22 DIAGNOSIS — D16.9 ENCHONDROMA OF BONE: Primary | ICD-10-CM

## 2023-09-22 NOTE — QUICK NOTE
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 Was recommended to have surveillance images so a plain radiograph was ordered and if there are any changes worrisome for aggressive nature then a repeat MRI with and without IV gadolinium will be ordered and follow-up with Dr. Alma Byrd of orthopedic oncology would be indicated. This is unlikely to be required but we will see what the radiologist say about the plain x-ray when it has been obtained.

## 2023-10-09 ENCOUNTER — HOSPITAL ENCOUNTER (OUTPATIENT)
Dept: RADIOLOGY | Facility: HOSPITAL | Age: 53
Discharge: HOME | End: 2023-10-09
Payer: COMMERCIAL

## 2023-10-09 ENCOUNTER — HOSPITAL ENCOUNTER (OUTPATIENT)
Dept: MAMMOGRAPHY | Facility: CLINIC | Age: 53
Discharge: HOME | End: 2023-10-09
Payer: COMMERCIAL

## 2023-10-09 ENCOUNTER — HOSPITAL ENCOUNTER (OUTPATIENT)
Dept: ULTRASOUND IMAGING | Facility: CLINIC | Age: 53
Discharge: HOME | End: 2023-10-09
Payer: COMMERCIAL

## 2023-10-09 VITALS — HEIGHT: 66 IN | WEIGHT: 125 LBS | BODY MASS INDEX: 20.09 KG/M2

## 2023-10-09 DIAGNOSIS — R92.8 ABNORMAL MAMMOGRAM: ICD-10-CM

## 2023-10-09 DIAGNOSIS — D16.9 ENCHONDROMA OF BONE: ICD-10-CM

## 2023-10-09 PROCEDURE — 77065 DX MAMMO INCL CAD UNI: CPT

## 2023-10-09 PROCEDURE — G0279 TOMOSYNTHESIS, MAMMO: HCPCS

## 2023-10-09 PROCEDURE — 76642 ULTRASOUND BREAST LIMITED: CPT

## 2023-10-09 PROCEDURE — 73630 X-RAY EXAM OF FOOT: CPT

## 2024-02-21 PROBLEM — Z13.220 SCREENING FOR LIPID DISORDERS: Status: RESOLVED | Noted: 2019-10-30 | Resolved: 2024-02-21

## 2024-07-16 ENCOUNTER — NURSE TRIAGE (OUTPATIENT)
Age: 54
End: 2024-07-16

## 2024-07-16 NOTE — TELEPHONE ENCOUNTER
"Pt requesting copy of most recent C&RS OV note. Walked pt though accessing via [employer]. Pt was able to locate note.    Answer Assessment - Initial Assessment Questions  1. REASON FOR CALL or QUESTION: \"What is your reason for calling today?\" or \"How can I best help you?\" or \"What question do you have that I can help answer?\"      Pt requesting copy of most recent C&RS OV note.    Protocols used: Information Only Call - No Triage-ADULT-[place]    "

## 2024-07-29 ENCOUNTER — TELEPHONE (OUTPATIENT)
Age: 54
End: 2024-07-29

## 2024-07-29 NOTE — TELEPHONE ENCOUNTER
Patients GI provider:  Dr. Grant    Number to return call: [phone]    Reason for call:  Pt requesting a call back from Dr. Grant. Pt states since last being seen by him she has moved a hour away and would prefer a phone call to answer questions she has. States she was told she would be prone to rectal prolapse as she gets older and wants to know if a hemorrhoidectomy would contribute to rectal prolapse?        Scheduled procedure/appointment date if applicable:

## 2024-07-30 NOTE — TELEPHONE ENCOUNTER
Contacted pt and reviewed recommendations during last ofc visit. Advised she would have to be re-evaluated in the office before proceeding w/ sx scheduling; pt agreeable. Pt scheduled for OV on 9/9/2024.  Pt added to [facility].

## 2024-09-09 ENCOUNTER — OFFICE VISIT (OUTPATIENT)
Age: 54
End: 2024-09-09
Payer: COMMERCIAL

## 2024-09-09 VITALS — HEIGHT: 66 IN | BODY MASS INDEX: 20.18 KG/M2 | SYSTOLIC BLOOD PRESSURE: 112 MMHG | DIASTOLIC BLOOD PRESSURE: 64 MMHG

## 2024-09-09 DIAGNOSIS — K64.0 GRADE I HEMORRHOIDS: Primary | ICD-10-CM

## 2024-09-09 PROCEDURE — 99213 OFFICE O/P EST LOW 20 MIN: CPT | Performed by: COLON & RECTAL SURGERY

## 2024-09-09 RX ORDER — AZELASTINE 1 MG/ML
1 SPRAY, METERED NASAL 2 TIMES DAILY
COMMUNITY

## 2024-09-09 RX ORDER — HYDROCORTISONE 2.5 %
CREAM (GRAM) TOPICAL 4 TIMES DAILY PRN
COMMUNITY

## 2024-09-09 RX ORDER — VALACYCLOVIR HYDROCHLORIDE 500 MG/1
TABLET, FILM COATED ORAL
COMMUNITY
Start: 2024-09-09

## 2024-09-09 RX ORDER — ALBUTEROL SULFATE 90 UG/1
AEROSOL, METERED RESPIRATORY (INHALATION)
COMMUNITY

## 2024-09-09 NOTE — ASSESSMENT & PLAN NOTE
Internal hemorrhoidal redundancy with external hemorrhoidal irritation and thickening are found on examination.  The patient has persistent anal irritation and itching.    We had a long discussion regarding options of therapy.  I do not think hemorrhoidectomy is reasonable given the extensive nature of the surgery and the mild external hemorrhoidal findings.  Internal hemorrhoids could be treated with hemorrhoidectomy but I think a THD or rubber banding may be more appropriate.  I think the rubber banding is reasonable to try if symptoms persist.    We decided to try medical management maximization.  Calmoseptine and Benadryl ointment should be trialed.  I gave her samples of Calmoseptine.  If these fail to help her, rubber banding would be our next step.  This may help to lift the internal hemorrhoidal mucosa to allow for reduction of symptoms of itching and irritation.  The patient is to avoid wipe trauma and to maximize her fiber intake.    She is to return as needed for failure of therapy.

## 2024-09-09 NOTE — PROGRESS NOTES
Colon and Rectal Surgery   Debra River 54 y.o. female MRN [id number]  Encounter: [id number]  09/09/24 3:39 PM            Assessment: Debra River is a 54 y.o. female who has hemorrhoids.      Plan:   Grade I hemorrhoids  Internal hemorrhoidal redundancy with external hemorrhoidal irritation and thickening are found on examination.  The patient has persistent anal irritation and itching.    We had a long discussion regarding options of therapy.  I do not think hemorrhoidectomy is reasonable given the extensive nature of the surgery and the mild external hemorrhoidal findings.  Internal hemorrhoids could be treated with hemorrhoidectomy but I think a THD or rubber banding may be more appropriate.  I think the rubber banding is reasonable to try if symptoms persist.    We decided to try medical management maximization.  Calmoseptine and Benadryl ointment should be trialed.  I gave her samples of Calmoseptine.  If these fail to help her, rubber banding would be our next step.  This may help to lift the internal hemorrhoidal mucosa to allow for reduction of symptoms of itching and irritation.  The patient is to avoid wipe trauma and to maximize her fiber intake.    She is to return as needed for failure of therapy.      Subjective     HPI    Debra River is a 54 y.o. female who is here to discuss surgery for hemorrhoidal symptoms.     The patient notes anal burning and itching starting years ago and for which she has been using Hydrocortisone cream.  Denies rectal bleeding recently, as well as any constipation or diarrhea.     Last seen in the office on 10/24/2022 for grade l hemorrhoids.    She was recently seen at colonoscopy on 9/7/2022 which was within normal limits.  Recall colonoscopy 10 years.      Historical Information   Past Medical History:   Diagnosis Date    Cardiac murmur     COVID 01/2022    early January    Kidney stone     Known health problems: none     Low blood pressure     PONV  (postoperative nausea and vomiting)     Pt requests to be pre-medicated for severe N/V    Shingles     Stress fracture of right fibula     Viral meningitis      Past Surgical History:   Procedure Laterality Date    BREAST BIOPSY      in the past 8 years not sure which breast /patient    HAND SURGERY Left     INCISIONAL BREAST BIOPSY  2016    ME HYSTEROSCOPY BX ENDOMETRIUM&/POLYPC W/WO D&C N/A 11/19/2020    Procedure: D&C, HYSTEROSCOPY;  Surgeon: Mary Ellen Ham DO;  [facility];  Service: Gynecology    ME HYSTEROSCOPY ENDOMETRIAL ABLATION N/A 11/19/2020    Procedure: ABLATION;  Surgeon: Mary Ellen Ham DO;  [facility];  Service: Gynecology    ME RPR UMBILICAL HRNA 5 YRS/> REDUCIBLE N/A 3/29/2022    Procedure: REPAIR HERNIA UMBILICAL;  Surgeon: Jordan Hernandez MD;  [facility];  Service: General    TONSILLECTOMY      VENTRAL HERNIA REPAIR N/A 3/29/2022    Procedure: REPAIR HERNIA VENTRAL;  Surgeon: Jordan Hernandez MD;  [facility];  Service: General    WISDOM TOOTH EXTRACTION         Meds/Allergies       Current Outpatient Medications:     albuterol (PROVENTIL HFA,VENTOLIN HFA) 90 mcg/act inhaler, PRN, Disp: , Rfl:     azelastine (ASTELIN) 0.1 % nasal spray, 1 spray into each nostril 2 (two) times a day Use in each nostril as directed, Disp: , Rfl:     diphenhydrAMINE-zinc acetate (BENADRYL) cream, Apply topically 3 (three) times a day as needed for itching, Disp: 2 g, Rfl: 3    hydrocortisone 2.5 % cream, Apply topically 4 (four) times a day as needed, Disp: , Rfl:     valACYclovir (VALTREX) 500 mg tablet, , Disp: , Rfl:     metroNIDAZOLE (METROGEL) 0.75 % vaginal gel, INSERT ONE APPLICATORFUL VAGINALLY AT BEDTIME FOR 5 NIGHTS, Disp: , Rfl:     Orilissa 150 MG TABS, , Disp: , Rfl:   No Known Allergies    Social History   Social History     Substance and Sexual Activity   Drug Use Never    Comment: No use     Social History     Tobacco Use   Smoking Status Never   Smokeless  "Tobacco Never         Family History   Problem Relation Age of Onset    Arrhythmia Mother     Hypertension Mother     Suicide Attempts Father     No Known Problems Daughter     No Known Problems Daughter     No Known Problems Maternal Grandmother     No Known Problems Maternal Grandfather     No Known Problems Paternal Grandmother     Suicide Attempts Brother     Breast cancer Other         pt. does not know         Review of Systems   Constitutional: Negative.    Gastrointestinal:         Anal irritation and itching       Objective   Current Vitals:  Vitals:    09/09/24 1504   BP: 112/64   Height: 5' 6\" (1.676 m)         Physical Exam  Constitutional:       Appearance: Normal appearance.   Pulmonary:      Breath sounds: No wheezing.   Genitourinary:     Comments: Anoscopy and digital exam show redundant internal hemorrhoidal mucosa along with distal rectal mucosa but no true rectal prolapse.  The external hemorrhoids are associated with some thickening of the skin and pleating of the skin when relaxed.  There is a very small amount of abrasion on the external hemorrhoidal skin.  Neurological:      Mental Status: She is alert.               "

## 2025-01-07 DIAGNOSIS — Z00.00 ENCOUNTER FOR PREVENTIVE HEALTH EXAMINATION: Primary | ICD-10-CM

## 2025-01-23 ENCOUNTER — HOSPITAL ENCOUNTER (OUTPATIENT)
Dept: ULTRASOUND IMAGING | Facility: HOSPITAL | Age: 55
Discharge: HOME | End: 2025-01-23
Attending: FAMILY MEDICINE

## 2025-01-23 ENCOUNTER — HOSPITAL ENCOUNTER (OUTPATIENT)
Dept: CT IMAGING | Facility: HOSPITAL | Age: 55
Discharge: HOME | End: 2025-01-23
Attending: FAMILY MEDICINE

## 2025-01-23 ENCOUNTER — RESULTS FOLLOW-UP (OUTPATIENT)
Dept: FAMILY MEDICINE CLINIC | Facility: CLINIC | Age: 55
End: 2025-01-23

## 2025-01-23 ENCOUNTER — OFFICE VISIT (OUTPATIENT)
Dept: FAMILY MEDICINE CLINIC | Facility: CLINIC | Age: 55
End: 2025-01-23

## 2025-01-23 ENCOUNTER — HOSPITAL ENCOUNTER (OUTPATIENT)
Dept: NON INVASIVE DIAGNOSTICS | Facility: HOSPITAL | Age: 55
Discharge: HOME | End: 2025-01-23

## 2025-01-23 ENCOUNTER — LAB (OUTPATIENT)
Dept: LAB | Facility: CLINIC | Age: 55
End: 2025-01-23

## 2025-01-23 ENCOUNTER — OFFICE VISIT (OUTPATIENT)
Dept: DERMATOLOGY | Facility: CLINIC | Age: 55
End: 2025-01-23
Payer: COMMERCIAL

## 2025-01-23 ENCOUNTER — HOSPITAL ENCOUNTER (OUTPATIENT)
Dept: VASCULAR ULTRASOUND | Facility: HOSPITAL | Age: 55
Discharge: HOME | End: 2025-01-23
Attending: FAMILY MEDICINE

## 2025-01-23 VITALS
SYSTOLIC BLOOD PRESSURE: 100 MMHG | RESPIRATION RATE: 12 BRPM | WEIGHT: 134 LBS | DIASTOLIC BLOOD PRESSURE: 60 MMHG | HEIGHT: 66 IN | HEART RATE: 70 BPM | BODY MASS INDEX: 21.53 KG/M2 | TEMPERATURE: 97.2 F | OXYGEN SATURATION: 99 %

## 2025-01-23 VITALS — WEIGHT: 125 LBS | BODY MASS INDEX: 20.09 KG/M2 | HEIGHT: 66 IN

## 2025-01-23 VITALS — TEMPERATURE: 97.9 F | WEIGHT: 120.5 LBS | BODY MASS INDEX: 18.91 KG/M2 | HEIGHT: 67 IN

## 2025-01-23 VITALS
HEIGHT: 66 IN | HEART RATE: 70 BPM | DIASTOLIC BLOOD PRESSURE: 60 MMHG | WEIGHT: 125 LBS | SYSTOLIC BLOOD PRESSURE: 100 MMHG | BODY MASS INDEX: 20.09 KG/M2

## 2025-01-23 DIAGNOSIS — R93.89 ABNORMAL FINDING ON CT SCAN: ICD-10-CM

## 2025-01-23 DIAGNOSIS — R93.1 ELEVATED CORONARY ARTERY CALCIUM SCORE: ICD-10-CM

## 2025-01-23 DIAGNOSIS — M79.5 FOREIGN BODY (FB) IN SOFT TISSUE: ICD-10-CM

## 2025-01-23 DIAGNOSIS — Z00.00 WELL ADULT ON ROUTINE HEALTH CHECK: Primary | ICD-10-CM

## 2025-01-23 DIAGNOSIS — S90.859A: Primary | ICD-10-CM

## 2025-01-23 DIAGNOSIS — E78.2 MIXED HYPERLIPIDEMIA: ICD-10-CM

## 2025-01-23 DIAGNOSIS — Z00.00 ENCOUNTER FOR PREVENTIVE HEALTH EXAMINATION: ICD-10-CM

## 2025-01-23 DIAGNOSIS — K64.0 GRADE I HEMORRHOIDS: ICD-10-CM

## 2025-01-23 DIAGNOSIS — K22.89 ESOPHAGEAL THICKENING: ICD-10-CM

## 2025-01-23 DIAGNOSIS — K44.9 HIATAL HERNIA: ICD-10-CM

## 2025-01-23 DIAGNOSIS — B00.9 HSV-2 INFECTION: ICD-10-CM

## 2025-01-23 DIAGNOSIS — I35.1 NONRHEUMATIC AORTIC VALVE INSUFFICIENCY: ICD-10-CM

## 2025-01-23 DIAGNOSIS — N28.1 RENAL CYSTS, ACQUIRED, BILATERAL: ICD-10-CM

## 2025-01-23 DIAGNOSIS — M85.88 OSTEOPENIA OF LUMBAR SPINE: ICD-10-CM

## 2025-01-23 PROBLEM — M22.2X2 PATELLOFEMORAL PAIN SYNDROME OF LEFT KNEE: Status: RESOLVED | Noted: 2021-04-30 | Resolved: 2025-01-23

## 2025-01-23 PROBLEM — G44.52 NEW PERSISTENT DAILY HEADACHE: Status: RESOLVED | Noted: 2021-05-11 | Resolved: 2025-01-23

## 2025-01-23 PROBLEM — M84.363A STRESS FRACTURE OF RIGHT FIBULA: Status: RESOLVED | Noted: 2019-03-22 | Resolved: 2025-01-23

## 2025-01-23 PROBLEM — N92.0 MENORRHAGIA WITH REGULAR CYCLE: Status: RESOLVED | Noted: 2019-10-30 | Resolved: 2025-01-23

## 2025-01-23 PROBLEM — Z98.890 PONV (POSTOPERATIVE NAUSEA AND VOMITING): Status: RESOLVED | Noted: 2020-11-19 | Resolved: 2025-01-23

## 2025-01-23 PROBLEM — R11.2 PONV (POSTOPERATIVE NAUSEA AND VOMITING): Status: RESOLVED | Noted: 2020-11-19 | Resolved: 2025-01-23

## 2025-01-23 PROBLEM — K42.9 UMBILICAL HERNIA WITHOUT OBSTRUCTION AND WITHOUT GANGRENE: Status: RESOLVED | Noted: 2022-02-16 | Resolved: 2025-01-23

## 2025-01-23 PROBLEM — R79.89 POSITIVE D-DIMER: Status: RESOLVED | Noted: 2017-08-09 | Resolved: 2025-01-23

## 2025-01-23 PROBLEM — Z09 STATUS POST UMBILICAL HERNIA REPAIR, FOLLOW-UP EXAM: Status: RESOLVED | Noted: 2022-04-11 | Resolved: 2025-01-23

## 2025-01-23 LAB
25(OH)D3 SERPL-MCNC: 41.2 NG/ML (ref 30–100)
ALBUMIN SERPL BCG-MCNC: 3.9 G/DL (ref 3.5–5)
ALP SERPL-CCNC: 70 U/L (ref 34–104)
ALT SERPL W P-5'-P-CCNC: 19 U/L (ref 7–52)
ANION GAP SERPL CALCULATED.3IONS-SCNC: 4 MMOL/L (ref 4–13)
AORTIC ROOT: 3.2 CM
ASCENDING AORTA: 3.3 CM
AST SERPL W P-5'-P-CCNC: 23 U/L (ref 13–39)
ATRIAL RATE: 84 BPM
BACTERIA UR QL AUTO: ABNORMAL /HPF
BASOPHILS # BLD AUTO: 0.04 THOUSANDS/ΜL (ref 0–0.1)
BASOPHILS NFR BLD AUTO: 1 % (ref 0–1)
BILIRUB SERPL-MCNC: 0.47 MG/DL (ref 0.2–1)
BILIRUB UR QL STRIP: NEGATIVE
BSA FOR ECHO PROCEDURE: 1.69 M2
BUN SERPL-MCNC: 21 MG/DL (ref 5–25)
CALCIUM SERPL-MCNC: 8.9 MG/DL (ref 8.4–10.2)
CHEST PAIN STATEMENT: NORMAL
CHLORIDE SERPL-SCNC: 111 MMOL/L (ref 96–108)
CHOLEST SERPL-MCNC: 218 MG/DL (ref ?–200)
CLARITY UR: CLEAR
CO2 SERPL-SCNC: 26 MMOL/L (ref 21–32)
COLOR UR: ABNORMAL
CREAT SERPL-MCNC: 0.88 MG/DL (ref 0.6–1.3)
CRP SERPL HS-MCNC: 0.23 MG/L
E WAVE DECELERATION TIME: 214 MS
E/A RATIO: 1
EOSINOPHIL # BLD AUTO: 0.08 THOUSAND/ΜL (ref 0–0.61)
EOSINOPHIL NFR BLD AUTO: 1 % (ref 0–6)
ERYTHROCYTE [DISTWIDTH] IN BLOOD BY AUTOMATED COUNT: 12.2 % (ref 11.6–15.1)
EST. AVERAGE GLUCOSE BLD GHB EST-MCNC: 105 MG/DL
FRACTIONAL SHORTENING: 34 (ref 28–44)
GFR SERPL CREATININE-BSD FRML MDRD: 74 ML/MIN/1.73SQ M
GLUCOSE P FAST SERPL-MCNC: 92 MG/DL (ref 65–99)
GLUCOSE UR STRIP-MCNC: NEGATIVE MG/DL
HBA1C MFR BLD: 5.3 %
HCT VFR BLD AUTO: 43.3 % (ref 34.8–46.1)
HCV AB SER QL: NORMAL
HDLC SERPL-MCNC: 75 MG/DL
HGB BLD-MCNC: 13.9 G/DL (ref 11.5–15.4)
HGB UR QL STRIP.AUTO: NEGATIVE
IMM GRANULOCYTES # BLD AUTO: 0.01 THOUSAND/UL (ref 0–0.2)
IMM GRANULOCYTES NFR BLD AUTO: 0 % (ref 0–2)
INTERVENTRICULAR SEPTUM IN DIASTOLE (PARASTERNAL SHORT AXIS VIEW): 0.9 CM
INTERVENTRICULAR SEPTUM: 0.9 CM (ref 0.6–1.1)
KETONES UR STRIP-MCNC: NEGATIVE MG/DL
LAAS-AP2: 18.1 CM2
LAAS-AP4: 18.1 CM2
LDLC SERPL CALC-MCNC: 130 MG/DL (ref 0–100)
LEFT ATRIUM AREA SYSTOLE SINGLE PLANE A4C: 19 CM2
LEFT ATRIUM SIZE: 4.3 CM
LEFT ATRIUM VOLUME (MOD BIPLANE): 53 ML
LEFT ATRIUM VOLUME INDEX (MOD BIPLANE): 32.3 ML/M2
LEFT INTERNAL DIMENSION IN SYSTOLE: 2.9 CM (ref 2.1–4)
LEFT VENTRICULAR INTERNAL DIMENSION IN DIASTOLE: 4.4 CM (ref 3.5–6)
LEFT VENTRICULAR POSTERIOR WALL IN END DIASTOLE: 0.9 CM
LEFT VENTRICULAR STROKE VOLUME: 54 ML
LEUKOCYTE ESTERASE UR QL STRIP: NEGATIVE
LV EF US.2D.A4C+ESTIMATED: 76 %
LVSV (TEICH): 54 ML
LYMPHOCYTES # BLD AUTO: 1.48 THOUSANDS/ΜL (ref 0.6–4.47)
LYMPHOCYTES NFR BLD AUTO: 18 % (ref 14–44)
MAX DIASTOLIC BP: 64 MMHG
MAX HR PERCENT: 90 %
MAX HR: 151 BPM
MAX PREDICTED HEART RATE: 166 BPM
MCH RBC QN AUTO: 31.9 PG (ref 26.8–34.3)
MCHC RBC AUTO-ENTMCNC: 32.1 G/DL (ref 31.4–37.4)
MCV RBC AUTO: 99 FL (ref 82–98)
MONOCYTES # BLD AUTO: 0.57 THOUSAND/ΜL (ref 0.17–1.22)
MONOCYTES NFR BLD AUTO: 7 % (ref 4–12)
MUCOUS THREADS UR QL AUTO: ABNORMAL
MV E'TISSUE VEL-SEP: 10 CM/S
MV PEAK A VEL: 0.88 M/S
MV PEAK E VEL: 88 CM/S
MV STENOSIS PRESSURE HALF TIME: 62 MS
MV VALVE AREA P 1/2 METHOD: 3.5
NEUTROPHILS # BLD AUTO: 6.09 THOUSANDS/ΜL (ref 1.85–7.62)
NEUTS SEG NFR BLD AUTO: 73 % (ref 43–75)
NITRITE UR QL STRIP: NEGATIVE
NON-SQ EPI CELLS URNS QL MICRO: ABNORMAL /HPF
NRBC BLD AUTO-RTO: 0 /100 WBCS
P AXIS: 74 DEGREES
PH UR STRIP.AUTO: 5.5 [PH]
PLATELET # BLD AUTO: 199 THOUSANDS/UL (ref 149–390)
PMV BLD AUTO: 11.3 FL (ref 8.9–12.7)
POTASSIUM SERPL-SCNC: 4.2 MMOL/L (ref 3.5–5.3)
PR INTERVAL: 154 MS
PROT SERPL-MCNC: 6.3 G/DL (ref 6.4–8.4)
PROT UR STRIP-MCNC: NEGATIVE MG/DL
PROTOCOL NAME: NORMAL
QRS AXIS: 59 DEGREES
QRSD INTERVAL: 82 MS
QT INTERVAL: 386 MS
QTC INTERVAL: 457 MS
RATE PRESSURE PRODUCT: NORMAL
RBC # BLD AUTO: 4.36 MILLION/UL (ref 3.81–5.12)
RBC #/AREA URNS AUTO: ABNORMAL /HPF
REASON FOR TERMINATION: NORMAL
RIGHT ATRIUM AREA SYSTOLE A4C: 10.8 CM2
RIGHT VENTRICLE ID DIMENSION: 2.8 CM
SL CV LEFT ATRIUM LENGTH A2C: 6 CM
SL CV LV EF: 70
SL CV LV EF: 70
SL CV PED ECHO LEFT VENTRICLE DIASTOLIC VOLUME (MOD BIPLANE) 2D: 88 ML
SL CV PED ECHO LEFT VENTRICLE SYSTOLIC VOLUME (MOD BIPLANE) 2D: 33 ML
SL CV STRESS RECOVERY BP: NORMAL MMHG
SL CV STRESS RECOVERY HR: 96 BPM
SL CV STRESS RECOVERY O2 SAT: 100 %
SL CV STRESS STAGE REACHED: 4
SODIUM SERPL-SCNC: 141 MMOL/L (ref 135–147)
SP GR UR STRIP.AUTO: 1.03 (ref 1–1.03)
STRESS ANGINA INDEX: 0
STRESS BASELINE BP: NORMAL MMHG
STRESS BASELINE HR: 73 BPM
STRESS O2 SAT REST: 98 %
STRESS PEAK HR: 142 BPM
STRESS POST ESTIMATED WORKLOAD: 13.4 METS
STRESS POST EXERCISE DUR MIN: 12 MIN
STRESS POST EXERCISE DUR MIN: 12 MIN
STRESS POST EXERCISE DUR SEC: 4 SEC
STRESS POST EXERCISE DUR SEC: 4 SEC
STRESS POST O2 SAT PEAK: 96 %
STRESS POST PEAK BP: 100 MMHG
STRESS POST PEAK HR: 151 BPM
STRESS POST PEAK SYSTOLIC BP: 136 MMHG
T WAVE AXIS: 39 DEGREES
TARGET HR FORMULA: NORMAL
TEST INDICATION: NORMAL
TRICUSPID ANNULAR PLANE SYSTOLIC EXCURSION: 2.4 CM
TRIGL SERPL-MCNC: 65 MG/DL (ref ?–150)
TSH SERPL DL<=0.05 MIU/L-ACNC: 2.51 UIU/ML (ref 0.45–4.5)
URATE SERPL-MCNC: 4.6 MG/DL (ref 2–7.5)
UROBILINOGEN UR STRIP-ACNC: <2 MG/DL
VENTRICULAR RATE: 84 BPM
WBC # BLD AUTO: 8.27 THOUSAND/UL (ref 4.31–10.16)
WBC #/AREA URNS AUTO: ABNORMAL /HPF

## 2025-01-23 PROCEDURE — 80061 LIPID PANEL: CPT

## 2025-01-23 PROCEDURE — 93010 ELECTROCARDIOGRAM REPORT: CPT | Performed by: INTERNAL MEDICINE

## 2025-01-23 PROCEDURE — 82306 VITAMIN D 25 HYDROXY: CPT

## 2025-01-23 PROCEDURE — 81001 URINALYSIS AUTO W/SCOPE: CPT

## 2025-01-23 PROCEDURE — 83695 ASSAY OF LIPOPROTEIN(A): CPT

## 2025-01-23 PROCEDURE — 76700 US EXAM ABDOM COMPLETE: CPT

## 2025-01-23 PROCEDURE — VASC: Performed by: SURGERY

## 2025-01-23 PROCEDURE — 93922 UPR/L XTREMITY ART 2 LEVELS: CPT

## 2025-01-23 PROCEDURE — 28190 REMOVAL OF FOOT FOREIGN BODY: CPT | Performed by: NURSE PRACTITIONER

## 2025-01-23 PROCEDURE — 76830 TRANSVAGINAL US NON-OB: CPT

## 2025-01-23 PROCEDURE — 93350 STRESS TTE ONLY: CPT

## 2025-01-23 PROCEDURE — 86803 HEPATITIS C AB TEST: CPT

## 2025-01-23 PROCEDURE — 86141 C-REACTIVE PROTEIN HS: CPT

## 2025-01-23 PROCEDURE — 80053 COMPREHEN METABOLIC PANEL: CPT

## 2025-01-23 PROCEDURE — 76856 US EXAM PELVIC COMPLETE: CPT

## 2025-01-23 PROCEDURE — 85025 COMPLETE CBC W/AUTO DIFF WBC: CPT

## 2025-01-23 PROCEDURE — 36415 COLL VENOUS BLD VENIPUNCTURE: CPT

## 2025-01-23 PROCEDURE — 84550 ASSAY OF BLOOD/URIC ACID: CPT | Performed by: FAMILY MEDICINE

## 2025-01-23 PROCEDURE — 99499EX: Performed by: FAMILY MEDICINE

## 2025-01-23 PROCEDURE — 75571 CT HRT W/O DYE W/CA TEST: CPT

## 2025-01-23 PROCEDURE — 93306 TTE W/DOPPLER COMPLETE: CPT

## 2025-01-23 PROCEDURE — 93306 TTE W/DOPPLER COMPLETE: CPT | Performed by: INTERNAL MEDICINE

## 2025-01-23 PROCEDURE — 83036 HEMOGLOBIN GLYCOSYLATED A1C: CPT

## 2025-01-23 PROCEDURE — 99203 OFFICE O/P NEW LOW 30 MIN: CPT | Performed by: NURSE PRACTITIONER

## 2025-01-23 PROCEDURE — 93005 ELECTROCARDIOGRAM TRACING: CPT

## 2025-01-23 PROCEDURE — 84443 ASSAY THYROID STIM HORMONE: CPT

## 2025-01-23 PROCEDURE — 93350 STRESS TTE ONLY: CPT | Performed by: INTERNAL MEDICINE

## 2025-01-23 RX ORDER — ACETAMINOPHEN 160 MG
2000 TABLET,DISINTEGRATING ORAL DAILY
Start: 2025-01-23

## 2025-01-23 NOTE — ASSESSMENT & PLAN NOTE
Coronary calcium score was noted to be elevated today.  Her overall vascular risk calculates out quite low at 1.1% but she does have a known family history of cardiovascular disease.  Unfortunately, her father passed from suicide at age 56, so we do not know his history.  In conjunction with cardiology, I suggest statin therapy for elevated coronary calcium score.  At this time, she is elected to hold off on that and we will wait for her LP(a).  This seems to be in agreement that, if this is elevated, she will initiate statin therapy.  We will continue with routine lipid monitoring as well and I will place labs for 6 months from now.

## 2025-01-23 NOTE — ASSESSMENT & PLAN NOTE
She has some thickening around noted hiatal hernia on her coronary calcium score today.  Symptoms of dysphagia are minimal but I am going to set her up for an expedited EGD to rule out any esophageal issues.

## 2025-01-23 NOTE — PROGRESS NOTES
Hearing Assessment Summary:   Hearing Screening    250Hz 500Hz 1000Hz 2000Hz 3000Hz 4000Hz 6000Hz 8000Hz   Right ear 15 10 15 5 5 15 10 20   Left ear 10 15 15 15 0 10 15 10   Comments: HEARING EVALUATION    Name:  Debra River  :  1970  Age:  54 y.o.   MRN:  [id number]  Date of Evaluation: 25     History: [facility] Exam  Reason for visit: Debra River is being seen today for an evaluation of hearing as part of an [facility] examination.  Patient reports some difficulty understanding in noisy environments. She denies ear pain, tinnitus and vertigo, but notes some lightheadedness upon standing.      EVALUATION:    Otoscopic Evaluation:   Right Ear: Clear and healthy ear canal and tympanic membrane   Left Ear: Clear and healthy ear canal and tympanic membrane    Tympanometry:   Right: Type A - normal middle ear pressure and compliance   Left: Type A - normal middle ear pressure and compliance    Audiogram Results:  Normal peripheral hearing sensitivity in each ear. Excellent speech discrimination bilaterally.    *see attached audiogram      RECOMMENDATIONS:  Return to [facility]. for F/U, Copy to Patient/Caregiver, and hearing evaluation in 2-3 years (sooner if difficulty noted).    PATIENT EDUCATION:   Discussed results and recommendations with patient.  Questions were addressed and the patient was encouraged to contact our department should concerns arise.      Silvano Bhakta  Clinical Audiologist'

## 2025-01-23 NOTE — PROGRESS NOTES
Name: Debra River      : 1970      MRN: [id number]  Encounter Provider: [facility]  Encounter Date: 2025   Encounter department: [facility] SERVICES  :  Assessment & Plan  Foreign body (FB) in soft tissue  Refer to derm for removal of splinter in L sole         Your [facility]'s Board-Certified Dermatologist's Name: Cecilia Huang MD    Skin Screening Exam:    A chaperone was present throughout the entire encounter.      SKIN:  FULL ORGAN SYSTEM EXAM   Hair, Scalp, Ears, Face Normal except as noted below in Assessment   Neck, Cervical Chain Nodes Normal except as noted below in Assessment   Right Arm/Hand/Fingers Normal except as noted below in Assessment   Left Arm/Hand/Fingers Normal except as noted below in Assessment   Chest/Breasts/Axillae   Examined areas normal except as noted below in Assessment   Abdomen, Umbilicus Normal except as noted below in Assessment   Back/Spine Normal except as noted below in Assessment   Groin/Genitalia/Buttocks   Examined areas normal except as noted below in Assessment   Right Leg, Foot, Toes Normal except as noted below in Assessment   Left Leg, Foot, Toes Normal except as noted below in Assessment        Assessment and Plan by Diagnosis:  LENTIGO    Physical Exam:  Anatomic Location Affected:  CHEST  Morphological Description:  EVENLY PIGMENTED BROWN MACULES  Pertinent Positives:  Pertinent Negatives:    Additional History of Present Condition:      Assessment and Plan:       What is a lentigo?  A lentigo is a pigmented flat or slightly raised lesion with a clearly defined edge. Unlike an ephelis (freckle), it does not fade in the winter months. There are several kinds of lentigo.  The name lentigo originally referred to its appearance resembling a small lentil. The plural of lentigo is lentigines, although “lentigos” is also in common use.    Who gets lentigines?  Lentigines can affect males and females of all ages and races. Solar  lentigines are especially prevalent in fair skinned adults. Lentigines associated with syndromes are present at birth or arise during childhood.    What causes lentigines?  Common forms of lentigo are due to exposure to ultraviolet radiation:  Sun damage including sunburn   Indoor tanning   Phototherapy, especially photochemotherapy (PUVA)    What are the clinical features of lentigines?  Lentigines have been classified into several different types depending on what they look like, where they appear on the body, causative factors, and whether they are associated to other diseases or conditions.  Lentigines may be solitary or more often, multiple. Most lentigines are smaller than 5 mm in diameter.    Solar lentigo  A precursor to seborrhoeic keratosis   Found on chronically sun exposed sites such as hands, face, lower legs   May also follow sunburn to shoulders   Yellow, light or dark brown regular or irregular macule or thin plaque   May have a dry surface   Often has moth-eaten outline   Can slowly enlarge to several centimeters in diameter   May disappear, often through the process known as lichenoid keratosis   When atypical in appearance, may be difficult to distinguish from melanoma in situ  Found on any exposed or covered site from early childhood   Small macules may merge to form larger patches   Not associated with a syndrome   Also called lentigines profusa, multiple lentigines  In contrast, an ephelis (freckle) shows sun-induced increased melanin within the keratinocytes, without an increase in number of cells.  What is the treatment for lentigines?  Most lentigines are left alone. Attempts to lighten them may not be successful. The following approaches are used:  SPF 50+ broad-spectrum sunscreen   Hydroquinone bleaching cream   Alpha hydroxy acids   Vitamin C   Retinoids   Azelaic acid   Chemical peels  Individual lesions can be permanently removed using:  Cryotherapy   Intense pulsed light   Pigment  "lasers    How can lentigines be prevented?  Lentigines associated with exposure ultraviolet radiation can be prevented by very careful sun protection. Clothing is more successful at preventing new lentigines than are sunscreens.    What is the outlook for lentigines?  Lentigines usually persist. They may increase in number with age and sun exposure. Some in sun-protected sites may fade and disappear.     Use a moisturizer + sunscreen \"combo\" product such as Neutrogena Daily Defense SPF 50+ or CeraVe AM at least three times a day.  Follow-up with your private dermatologist or one of our board-certified [facility] Dermatologists as discussed.  [facility]'s Dermatology's own Dr. Luly Otto is available for consultation for skin enhancement and revitalization services; please mention \"[facility]\" for expedited scheduling    "

## 2025-01-23 NOTE — ASSESSMENT & PLAN NOTE
She was noted to be osteopenic on DEXA scan done at [facility] on 10/1/2024.  I would suggest vitamin D3 2000 international units daily as well as 1000 mg of calcium on a routine basis.  She will continue with routine weightbearing exercise including weight training and walking and repeat DEXA within 2 years.

## 2025-01-23 NOTE — PROGRESS NOTES
[facility] Physical Exam     Debra River is a 54 y.o. female who is presenting for an [facility] Physical Exam at [facility].  This is her first visit to our program.  Overall, Mrs. River rates her health as good and quite stable.  She exercises very routinely without cardiovascular or musculoskeletal limitations.    She notes having some chronic stress from her career as a  which she manages well with exercise, including yoga, walking, weight training etc.  She has an excellent diet.  She does have some early morning awakening at around 2:30 in the morning but notes she typically falls back to sleep.  This does tend to be worse when she is feeling overly stressed.  She denies any significant depression or anxiety.  Wanting    She has noticed some persistent rectal fullness and has a history of hemorrhoids.  Upon review, this has been discussed with her colorectal surgeon, Dr. Grant, and they have held off on any interventions at this time.  Unfortunately, she notes the fullness getting somewhat worse.  She has had no rectal bleeding and is up-to-date on colonoscopy, having undergone 1 in September 2022.      Review of Systems   Constitutional:  Negative for appetite change, chills, fatigue, fever and unexpected weight change.   HENT:  Negative for trouble swallowing.    Eyes:  Negative for visual disturbance.   Respiratory:  Negative for cough, chest tightness, shortness of breath and wheezing.    Cardiovascular:  Negative for chest pain, palpitations and leg swelling.   Gastrointestinal:  Negative for abdominal distention, abdominal pain, blood in stool, constipation and diarrhea.   Endocrine: Negative for polyuria.   Genitourinary:  Negative for difficulty urinating and flank pain.   Musculoskeletal:  Negative for arthralgias and myalgias.   Skin:  Negative for rash.   Neurological:  Negative for dizziness and light-headedness.   Hematological:   Negative for adenopathy. Does not bruise/bleed easily.   Psychiatric/Behavioral:  Negative for dysphoric mood and sleep disturbance. The patient is not nervous/anxious.          Active Ambulatory Problems     Diagnosis Date Noted    Enchondroma of Right 5th Metatarsal 03/22/2019    Shaheen-Barr virus seropositivity 08/08/2017    Status post hysteroscopy D&C 11/19/2020    Mixed hyperlipidemia 04/30/2021    Herpes zoster with questionable R optic nerve involvement 10/2007 05/11/2021    Lipoma of abdominal wall 02/16/2022    Grade I hemorrhoids 10/24/2022    Foreign body (FB) in soft tissue 01/23/2025    HSV-2 infection 01/23/2025    Elevated coronary artery calcium score 01/23/2025    Renal cysts, acquired, bilateral 01/23/2025    Osteopenia of lumbar spine 01/23/2025     Resolved Ambulatory Problems     Diagnosis Date Noted    Stress fracture of right fibula 03/22/2019    Positive D-dimer 08/09/2017    Menorrhagia with regular cycle 10/30/2019    Screening for lipid disorders 10/30/2019    PONV (postoperative nausea and vomiting) 11/19/2020    Patellofemoral pain syndrome of left knee 04/30/2021    New persistent daily headache 05/11/2021    Umbilical hernia without obstruction and without gangrene 02/16/2022    Status post umbilical hernia repair, follow-up exam 04/11/2022     Past Medical History:   Diagnosis Date    Cardiac murmur     COVID 01/2022    Kidney stone     Known health problems: none     Low blood pressure     Shingles     Viral meningitis        Past Surgical History:   Procedure Laterality Date    BREAST BIOPSY      in the past 8 years not sure which breast /patient    HAND SURGERY Left     INCISIONAL BREAST BIOPSY  2016    MD HYSTEROSCOPY BX ENDOMETRIUM&/POLYPC W/WO D&C N/A 11/19/2020    Procedure: D&C, HYSTEROSCOPY;  Surgeon: Mary Ellen Ham DO;  [facility];  Service: Gynecology    MD HYSTEROSCOPY ENDOMETRIAL ABLATION N/A 11/19/2020    Procedure: ABLATION;  Surgeon: Mary Ellen Ham  "DO;  [facility];  Service: Gynecology    IL RPR UMBILICAL HRNA 5 YRS/> REDUCIBLE N/A 03/29/2022    Procedure: REPAIR HERNIA UMBILICAL;  Surgeon: Jordan Hernandez MD;  [facility];  Service: General    TONSILLECTOMY      TONSILLECTOMY      VENTRAL HERNIA REPAIR N/A 03/29/2022    Procedure: REPAIR HERNIA VENTRAL;  Surgeon: Jordan Hernandez MD;  [facility];  Service: General    WISDOM TOOTH EXTRACTION         Family History   Problem Relation Age of Onset    Arrhythmia Mother     Hypertension Mother     Completed Suicide  Father     Glaucoma Father     Suicide Attempts Brother     Gout Brother     Completed Suicide  Brother     No Known Problems Son     No Known Problems Daughter     No Known Problems Daughter     No Known Problems Maternal Grandmother     No Known Problems Maternal Grandfather     No Known Problems Paternal Grandmother     Breast cancer Other         pt. does not know       Social History     Tobacco Use   Smoking Status Never   Smokeless Tobacco Never         Current Outpatient Medications:     Cholecalciferol (Vitamin D3) 50 MCG (2000 UT) capsule, Take 1 capsule (2,000 Units total) by mouth daily, Disp: , Rfl:     magnesium gluconate (MAGONATE) 500 mg tablet, Take 1 tablet (500 mg total) by mouth 2 (two) times a day, Disp: , Rfl:     valACYclovir (VALTREX) 500 mg tablet, , Disp: , Rfl:     No Known Allergies      Objective:    Vitals:    01/23/25 1140   BP: 100/60   Pulse: 70   Resp: 12   Temp: (!) 97.2 °F (36.2 °C)   SpO2: 99%   Weight: 60.8 kg (134 lb)   Height: 5' 6\" (1.676 m)        Physical Exam  Vitals reviewed.   Constitutional:       General: She is not in acute distress.     Appearance: She is well-developed. She is not diaphoretic.   HENT:      Head: Normocephalic.   Eyes:      General:         Right eye: No discharge.         Left eye: No discharge.      Pupils: Pupils are equal, round, and reactive to light.   Neck:      Thyroid: No thyromegaly.      Trachea: No " tracheal deviation.   Cardiovascular:      Rate and Rhythm: Normal rate and regular rhythm.      Heart sounds: Normal heart sounds. No murmur heard.  Pulmonary:      Effort: Pulmonary effort is normal. No respiratory distress.      Breath sounds: No wheezing or rales.   Abdominal:      General: There is no distension.      Palpations: Abdomen is soft.      Tenderness: There is no abdominal tenderness.   Musculoskeletal:         General: Normal range of motion.      Right lower leg: No edema.      Left lower leg: No edema.   Lymphadenopathy:      Cervical: No cervical adenopathy.   Skin:     General: Skin is warm.      Findings: No erythema.   Neurological:      General: No focal deficit present.      Mental Status: She is alert and oriented to person, place, and time.      Gait: Gait normal.   Psychiatric:         Thought Content: Thought content normal.         Judgment: Judgment normal.             Assessment/Plan:     1. Well adult on routine health check  -     magnesium gluconate (MAGONATE) 500 mg tablet; Take 1 tablet (500 mg total) by mouth 2 (two) times a day  -     Cholecalciferol (Vitamin D3) 50 MCG (2000 UT) capsule; Take 1 capsule (2,000 Units total) by mouth daily  -     Uric acid; Future  2. Foreign body (FB) in soft tissue  Assessment & Plan:  Refer to derm for removal of splinter in L sole       3. Grade I hemorrhoids  Assessment & Plan:  She is having some increased rectal pressure lately.  I did reach out to Dr. Grant to see how he would like to proceed.  4. Mixed hyperlipidemia  Assessment & Plan:  Total cholesterol is a bit elevated but HDL is quite high.  Her overall 10-year risk calculates out at 1.1%.  She did have an elevated coronary calcium score today at the 93rd percentile.  Vascular screening showed no concerning lesions.  Low-dose statin therapy could be considered in light of her elevated coronary calcium score as well as a family history of heart disease in an uncle and cousin.   Lipoprotein a is still pending and if elevated may warrant high-dose statin therapy.  I will follow-up with her when this results.  5. HSV-2 infection  Assessment & Plan:  Continue the Valtrex on a daily basis which has been working.  6. Elevated coronary artery calcium score  Assessment & Plan:  Coronary calcium score was noted to be elevated today.  Her overall vascular risk calculates out quite low at 1.1%.  7. Renal cysts, acquired, bilateral  Assessment & Plan:  She is noted to have benign-appearing bilateral renal cyst on exam today which require no further follow-up.  These are stable since 2016.  8. Osteopenia of lumbar spine  Assessment & Plan:  She was noted to be osteopenic on DEXA scan done at [facility] on 10/1/2024.  I would suggest vitamin D3 2000 international units daily as well as 1000 mg of calcium on a routine basis.  She will continue with routine weightbearing exercise including weight training and walking and repeat DEXA within 2 years.       [facility] Summary:     It was a pleasure to host Mrs. Debra River today at [facility].    Overall, she seems to be in excellent health.  She exercises routinely and did very well on stress testing.  It is noted that she had an elevated coronary calcium score but otherwise very low risk for vascular disease.  There is some family history of cardiovascular disease and low-dose statin could be considered.  LP(a) is still pending and could certainly warrant higher dose statin recommendation.  I will follow-up with her in this regard.    She is up-to-date on cancer screening including colonoscopy and mammogram.  I did reach out to her colorectal surgeon in light of some persistent rectal fullness to see if further evaluation/treatment may be warranted.    She does have a noted hiatal hernia with some esophageal thickening and will require an EGD which I will expedite.      She remains on low-dose Valtrex for HSV-2 which she may continue  indefinitely.    She requested a uric acid level be added to her labs today which is still pending.  Her brother has a history of gout.  I explained that we would likely not treat a mildly elevated level of uric acid in the absence of symptoms.    She appears to be up-to-date with Tdap but is a candidate for Shingrix, flu as well as COVID vaccines.  These can be accomplished through my office if she would like.    All of the above was reviewed with Kunal Perico and I welcomed her to contact me with any further questions.

## 2025-01-23 NOTE — ASSESSMENT & PLAN NOTE
Total cholesterol is a bit elevated but HDL is quite high.  Her overall 10-year risk calculates out at 1.1%.  She did have an elevated coronary calcium score today at the 93rd percentile.  Vascular screening showed no concerning lesions.  Low-dose statin therapy could be considered in light of her elevated coronary calcium score as well as a family history of heart disease in an uncle and cousin.  Lipoprotein a is still pending and if elevated may warrant high-dose statin therapy.  I will follow-up with her when this results.

## 2025-01-23 NOTE — ASSESSMENT & PLAN NOTE
She is noted to have benign-appearing bilateral renal cyst on exam today which require no further follow-up.  These are stable since 2016.

## 2025-01-23 NOTE — PROGRESS NOTES
Fitness Summary and Recommendations: Debra scored at the 95% on her body composition assessment with a bodyfat % of 14.9%. Debra scored in the excellent range for flexibility with a sit & reach score of 47 cm. Her Muscle Strength/Endurance scores placed her at the 95% (lower body) and 95% (upper body) with a chair stand score of 38 and arm curl score of 40 respectively. Debra's Cardiovascular Score of 60.3 placed her at the 95%. Overall, Debra would be considered to have an exemplary fitness level with an 95% score. Continued emphasis on regular exercise and sound nutrition practices will enable Debra to maintain her optimal level of fitness.

## 2025-01-23 NOTE — PROGRESS NOTES
"[facility] Clinic Note     Patient Name: Debra River  Encounter Date: 1/23/2025     Have you been cared for by a [facility] Dermatologist in the last 3 years and, if so, which description applies to you?    NO.   I am considered a \"new\" patient and must complete all patient intake questions. I am FEMALE/of child-bearing potential.    REVIEW OF SYSTEMS:  Have you recently had or currently have any of the following? Recent fever or chills? No  Any non-healing wound? No  Are you pregnant or planning to become pregnant? No  Are you currently or planning to be nursing or breast feeding? No   PAST MEDICAL HISTORY:  Have you personally ever had or currently have any of the following?  If \"YES,\" then please provide more detail. Skin cancer (such as Melanoma, Basal Cell Carcinoma, Squamous Cell Carcinoma?  No  Tuberculosis, HIV/AIDS, Hepatitis B or C: No  Radiation Treatment No   HISTORY OF IMMUNOSUPPRESSION:   Do you have a history of any of the following:  Systemic Immunosuppression such as Diabetes, Biologic or Immunotherapy, Chemotherapy, Organ Transplantation, Bone Marrow Transplantation or Prednsione?  No    Answering \"YES\" requires the addition of the dotphrase \"IMMUNOSUPPRESSED\" as the first diagnosis of the patient's visit.   FAMILY HISTORY:  Any \"first degree relatives\" (parent, brother, sister, or child) with the following?    Skin Cancer, Pancreatic or Other Cancer? No   PATIENT EXPERIENCE:    Do you want the Dermatologist to perform a COMPLETE skin exam today including a clinical examination under the \"bra and underwear\" areas?  NO  If necessary, do we have your permission to call and leave a detailed message on your Preferred Phone number that includes your specific medical information?  Yes      No Known Allergies   Current Outpatient Medications:     magnesium gluconate (MAGONATE) 500 mg tablet, Take 1 tablet (500 mg total) by mouth 2 (two) times a day, Disp: , Rfl:     valACYclovir (VALTREX) " "500 mg tablet, , Disp: , Rfl:     Cholecalciferol (Vitamin D3) 50 MCG (2000 UT) capsule, Take 1 capsule (2,000 Units total) by mouth daily, Disp: , Rfl:           Whom besides the patient is providing clinical information about today's encounter?   NO ADDITIONAL HISTORIAN (patient alone provided history)    Physical Exam and Assessment/Plan by Diagnosis:      FOREIGN BODY REMOVAL     Physical Exam:  Anatomic Location Affected:  sole of left foot   Morphological Description:  small visible brown foreign body protruding at surface of skin c/w wooden splinter; no obvious surrounding swelling, redness or tenderness       Universal Protocol:  Consent: Verbal consent obtained.  Risks and benefits: risks, benefits and alternatives were discussed  Consent given by: patient  Time out: Immediately prior to procedure a \"time out\" was called to verify the correct patient, procedure, equipment, support staff and site/side marked as required.  Patient understanding: patient states understanding of the procedure being performed  Patient consent: the patient's understanding of the procedure matches consent given  Patient identity confirmed: verbally with patient  Foreign body removal    Date/Time: 1/23/2025 3:20 PM    Performed by: JARET Levin  Authorized by: JARET Levin  Body area: skin  General location: lower extremity  Location details: left foot    Sedation:  Patient sedated: no  Patient restrained: no  Patient cooperative: yes  Localization method: visualized  Removal mechanism: forceps  Dressing: dressing applied  Tendon involvement: none  Depth: dermal.  Complexity: simple  1 objects recovered.  Objects recovered: wooden splinter  Post-procedure assessment: foreign body removed  Patient tolerance: patient tolerated the procedure well with no immediate complications      Additional History of Present Condition:  Patient presents for splinter removal; she reports splinter in sole of left foot for " a few days and unable to remove on her own.     Assessment and Plan:  Based on a thorough discussion of this condition and the management approach to it (including a comprehensive discussion of the known risks, side effects and potential benefits of treatment), the patient (family) agrees to implement the following specific plan:    Splinter removal performed in office; see procedure note.   Aftercare discussed.     Scribe Attestation      I,:  Yudith Ramirez MA am acting as a scribe while in the presence of the attending physician.:       I,:  JARET Levin personally performed the services described in this documentation    as scribed in my presence.:

## 2025-01-23 NOTE — ASSESSMENT & PLAN NOTE
She is having some increased rectal pressure lately.  I did reach out to Dr. Grant to see how he would like to proceed and he has suggested a follow up in his office.  His staff will be reaching out.

## 2025-01-23 NOTE — PROGRESS NOTES
"Nutritional Summary and Recommendations:    Patient Nutrition-Oriented Medical/Diet Hx  Debra presents today to [facility] for nutrition assessment and counseling. She has a very healthy and consistent diet. Debra has a large cup of water + tumeric, rubin, lemon, apple cider vinegar upon waking. Breakfast: oats + eggs or cottage cheese. Lunch: salad with chicken or a healthy sandwich. Dinner: grilled chicken with rice or pasta + vegetable. She eats ~ 2 snacks/day: sugar free chocolate covered almonds, greek yogurt, berries, cottage cheese.      Reviewed estimated fluid needs, discussed increased fluid needs d/t daily 1-3 hours of exercise per day, often doing hot yoga and sweating a lot.  Debra does feel she needs to be mindful of adequate fluid intake.  Cholesterol was high today even though she seems to be consuming a VERY healthy diet and doesn't appear to be even consuming 200 mg of cholesterol on a daily basis. Debra plans to discuss this with providers today. Question if it may be beneficial to check cortisol levels d/t connection with cholesterol-pt also notes recently starting to wake up ~ 2 am. It was a pleasure to meet with Debra today.      Nutrition Related Medications/Supplements:  Creatinine, Neutrofoil, Magnesium, Vitamin D     Labs:  A1C: 5.3  Total Cholesterol: 218  Triglycerides: 65  HDL: 75  LDL: 130  Vitamin D: 41.2    Anthropometrics:     Ht: 5'6\"  Wt: 131.5 lb   BMI: 21.3     BMR: 1286 kcal   Fat%: 14.9 %      Fat Mass: 19.6 lb FFM: 112.2 lb  TBW: 82.2 lbs     Estimated Energy Requirements:    Actual wt with consideration for high activity level    1800 kcal ( BMR x AF 1.4)  60-72 g (1.0-1.2 g/kg consideration for activity   5563-9287 mL/ 61-71 oz / 7.5-9 c fluid (30-35 mL/kg)--likely more is needed depending on activity/sweating    Nutrition Diagnosis:     EH Common Nutrition Dx: Altered nutrition related laboratory values  r/t Kidney/ liver/ cardiac/ endocrine/ neurological/ pulmonary " dysfunction as evidenced by cholesterol 218    Nutrition Recommendations:    Aim for at least 71+ oz of fluid per day  Reach out to dietitian with any additional questions or concerns    Nutrition Education     Adequate fluid intake      Perceived Comprehension:  Good     Expected Compliance: Good

## 2025-01-23 NOTE — ASSESSMENT & PLAN NOTE
She has noted to have a hiatal hernia on coronary calcium score today.  She has had some minimal dysphagia per her history and I am going to set her up for an EGD.

## 2025-01-23 NOTE — PROGRESS NOTES
HEART AND VASCULAR SUMMARY    1. Lipids: Total 218, TG 65, HDL 75,     2. ECG: Normal    3. Echocardiogram: Normal except for very mild aortic regurgitation    4. Stress ECHO: Normal    5. Vascular testing: Carotids Normal, Abdominal Aorta normal    6. Coronary Calcium CT: 54, abnormal, intermediate calcium score, 93rd percentile    7. The 10-year ASCVD risk score (Ileana ADAMS, et al., 2019) is: 0.9%    Values used to calculate the score:      Age: 54 years      Sex: Female      Is Non- : No      Diabetic: No      Tobacco smoker: No      Systolic Blood Pressure: 100 mmHg      Is BP treated: No      HDL Cholesterol: 75 mg/dL      Total Cholesterol: 218 mg/dL     8. HSCRP:   Lab Results   Component Value Date    HSCRP 0.23 01/23/2025       9. Vitals:   Vitals:    01/23/25 1140   BP: 100/60   Pulse: 70   Resp: 12   Temp: (!) 97.2 °F (36.2 °C)   SpO2: 99%       Impression and Recommendations:    Blood pressure is exceptional  Cholesterol is mildly elevated with an LDL of 130  Elevated coronary calcium score to 54 is abnormal although only in intermediately elevated level, and although this puts her at the 93rd percentile for age, her overall cardiac risk still calculates to low  But with a concerning family history of heart disease prematurely in an uncle and a cousin, lipoprotein a still pending, the most risk-reducing recommendation would be to start low-dose statin therapy to target an LDL less than 100.  If lipoprotein a is elevated, the LDL target is even lower, less than 70 and potentially even less than 55.

## 2025-01-24 ENCOUNTER — TELEPHONE (OUTPATIENT)
Age: 55
End: 2025-01-24

## 2025-01-24 LAB — LPA SERPL-SCNC: 14.3 NMOL/L

## 2025-01-25 ENCOUNTER — RESULTS FOLLOW-UP (OUTPATIENT)
Dept: FAMILY MEDICINE CLINIC | Facility: CLINIC | Age: 55
End: 2025-01-25

## 2025-01-27 PROBLEM — R93.89 ABNORMAL FINDING ON CT SCAN: Status: ACTIVE | Noted: 2025-01-27

## 2025-01-27 LAB
CHEST PAIN STATEMENT: NORMAL
MAX DIASTOLIC BP: 64 MMHG
MAX PREDICTED HEART RATE: 166 BPM
PROTOCOL NAME: NORMAL
REASON FOR TERMINATION: NORMAL
STRESS POST EXERCISE DUR MIN: 12 MIN
STRESS POST EXERCISE DUR SEC: 4 SEC
STRESS POST PEAK HR: 151 BPM
STRESS POST PEAK SYSTOLIC BP: 136 MMHG
TARGET HR FORMULA: NORMAL
TEST INDICATION: NORMAL

## 2025-01-28 NOTE — ASSESSMENT & PLAN NOTE
She was noted to have some potential esophageal thickening on coronary calcium score.  She does have some occasional very mild dysphagia symptoms.  I am going to expedite an evaluation with her GI team.

## 2025-01-31 ENCOUNTER — PREP FOR PROCEDURE (OUTPATIENT)
Dept: GASTROENTEROLOGY | Facility: CLINIC | Age: 55
End: 2025-01-31

## 2025-01-31 DIAGNOSIS — R13.19 ESOPHAGEAL DYSPHAGIA: ICD-10-CM

## 2025-01-31 DIAGNOSIS — K44.9 HIATAL HERNIA: ICD-10-CM

## 2025-01-31 DIAGNOSIS — K22.89 ESOPHAGEAL THICKENING: Primary | ICD-10-CM

## 2025-01-31 RX ORDER — SODIUM CHLORIDE, SODIUM LACTATE, POTASSIUM CHLORIDE, CALCIUM CHLORIDE 600; 310; 30; 20 MG/100ML; MG/100ML; MG/100ML; MG/100ML
125 INJECTION, SOLUTION INTRAVENOUS CONTINUOUS
OUTPATIENT
Start: 2025-01-31

## 2025-02-03 ENCOUNTER — DOCUMENTATION (OUTPATIENT)
Dept: GASTROENTEROLOGY | Facility: CLINIC | Age: 55
End: 2025-02-03

## 2025-02-03 ENCOUNTER — TELEPHONE (OUTPATIENT)
Age: 55
End: 2025-02-03

## 2025-02-03 ENCOUNTER — TELEPHONE (OUTPATIENT)
Dept: GASTROENTEROLOGY | Facility: CLINIC | Age: 55
End: 2025-02-03

## 2025-02-03 DIAGNOSIS — E78.2 MIXED HYPERLIPIDEMIA: ICD-10-CM

## 2025-02-03 DIAGNOSIS — R93.1 ELEVATED CORONARY ARTERY CALCIUM SCORE: Primary | ICD-10-CM

## 2025-02-03 RX ORDER — ROSUVASTATIN CALCIUM 5 MG/1
5 TABLET, COATED ORAL DAILY
Qty: 100 TABLET | Refills: 3 | Status: SHIPPED | OUTPATIENT
Start: 2025-02-03

## 2025-02-03 NOTE — TELEPHONE ENCOUNTER
----- Message from Yudith NUÑEZ sent at 2/3/2025 11:31 AM EST -----  Regarding: please reach out to this VIP person    ----- Message -----  From: Montez Bundy MD  Sent: 1/29/2025   8:19 AM EST  To: Shiraz Gaines Jr., MD; Yudith Wilkins MA; #    Thanks! My preference is to start with an office visit since our practice hasn't seen her before, but if she prefers to start with the EGD it's not a problem. I'm good with whatever she prefers. Let me know if she prefers to start with the EGD and I'll call her to discuss over the phone and then enter her procedure order. Thanks, Montez  ----- Message -----  From: Amy Del Torotiz  Sent: 1/28/2025   1:58 PM EST  To: Montez Bundy MD; Shiraz Gaines Jr., MD; #    [facility] Team,    Spoke with patient. However, she is unavailable this week she is currently in [place]. She can come in on Tues 2/4/24 at 2:40 pm although she is confused as why she needs this OV. She was under the impression she was having an EGD.    Please review and advise.     Thank you,  ----- Message -----  From: Montez Bundy MD  Sent: 1/28/2025   1:21 PM EST  To: Shiraz Gaines Jr., MD; Yudith Wilkins MA; #    I'm excited for our rematch with the [employer]!! Loved seeing Hur[facility] come through.    Dear staff, please add her to my schedule on 1/29 or 1/30 at 10am or 240pm.    Thanks!    Montez  ----- Message -----  From: Shiraz Gaines Jr., MD  Sent: 1/27/2025  10:29 PM EST  To: MD Montez Cunningham,    How about those Eagles!?    I was hoping someone from your team could see Debra in the near future.  We just saw her for an [facility] and she has some mild dysphagia with questionable esophageal thickening on her coronary calcium score.  Obviously, this did make her feel very stressed and I told her I would try to expedite a visit with someone from your team.    Let me know if you have any further questions and thank you very much for any assistance on this 1.    Nestor

## 2025-02-03 NOTE — TELEPHONE ENCOUNTER
Scheduled date of EGD(as of today): 2/24/25  Physician performing EGD: Dr. Bundy  Location of EGD: [facility]   Instructions reviewed with patient by: ODALIS  Clearances: N/A

## 2025-02-03 NOTE — TELEPHONE ENCOUNTER
Patients GI provider:  NP    Number to return call: ([phone]    Reason for call: Pt calling office to cancel appointment with Dr. Bundy for today. Made patient aware, no appt was scheduled.   Patient states that she spoke with Dr. Bundy via cell phone and discussed care. She states that she was told she can directly schedule EGD.   Please advise if patient needs appt or can be directly scheduled for EGD. Please contact patient to further assist.

## 2025-02-04 ENCOUNTER — TELEPHONE (OUTPATIENT)
Dept: GASTROENTEROLOGY | Facility: CLINIC | Age: 55
End: 2025-02-04

## 2025-02-04 NOTE — TELEPHONE ENCOUNTER
----- Message from Montez Bundy MD sent at 1/31/2025  3:55 PM EST -----  I spoke with her and entered the procedure order. Please schedule it with me at the location of her choice. Thanks!  ----- Message -----  From: Amy Mcrae  Sent: 1/29/2025   9:28 AM EST  To: Montez Bundy MD    Hi Dr. Bundy,    Patient prefers you give her a call.    Thank you,  ----- Message -----  From: Montez Bundy MD  Sent: 1/29/2025   8:19 AM EST  To: Shiraz Gaines Jr., MD; Yudith Wilkins MA; #    Thanks! My preference is to start with an office visit since our practice hasn't seen her before, but if she prefers to start with the EGD it's not a problem. I'm good with whatever she prefers. Let me know if she prefers to start with the EGD and I'll call her to discuss over the phone and then enter her procedure order. Thanks, Montez  ----- Message -----  From: Amy Mcrae  Sent: 1/28/2025   1:58 PM EST  To: Montez Bundy MD; Shiraz Gaines Jr., MD; #    [facility] Team,    Spoke with patient. However, she is unavailable this week she is currently in [place]. She can come in on Tues 2/4/24 at 2:40 pm although she is confused as why she needs this OV. She was under the impression she was having an EGD.    Please review and advise.     Thank you,  ----- Message -----  From: Montez Bundy MD  Sent: 1/28/2025   1:21 PM EST  To: Shiraz Gaines Jr., MD; Yudith Wilkins MA; #    I'm excited for our rematch with the [employer]!! Loved seeing [facility] come through.    Dear staff, please add her to my schedule on 1/29 or 1/30 at 10am or 240pm.    Thanks!    Montez  ----- Message -----  From: Shiraz Gaines Jr., MD  Sent: 1/27/2025  10:29 PM EST  To: MD Montez Cunningham,    How about those [employer]!?    I was hoping someone from your team could see Debra in the near future.  We just saw her for an [facility] and she has some mild dysphagia with questionable esophageal thickening on her coronary calcium score.  Obviously, this did make her feel very  stressed and I told her I would try to expedite a visit with someone from your team.    Let me know if you have any further questions and thank you very much for any assistance on this 1.    Nestor

## 2025-02-04 NOTE — PROGRESS NOTES
Patient has considered our recommendations from her [facility] physical and would like to initiate low-dose rosuvastatin.

## 2025-02-24 ENCOUNTER — ANESTHESIA (OUTPATIENT)
Dept: GASTROENTEROLOGY | Facility: HOSPITAL | Age: 55
End: 2025-02-24
Payer: COMMERCIAL

## 2025-02-24 ENCOUNTER — HOSPITAL ENCOUNTER (OUTPATIENT)
Dept: GASTROENTEROLOGY | Facility: HOSPITAL | Age: 55
Setting detail: OUTPATIENT SURGERY
Discharge: HOME | End: 2025-02-24
Attending: INTERNAL MEDICINE
Payer: COMMERCIAL

## 2025-02-24 ENCOUNTER — ANESTHESIA EVENT (OUTPATIENT)
Dept: GASTROENTEROLOGY | Facility: HOSPITAL | Age: 55
End: 2025-02-24
Payer: COMMERCIAL

## 2025-02-24 VITALS
WEIGHT: 125 LBS | SYSTOLIC BLOOD PRESSURE: 109 MMHG | OXYGEN SATURATION: 99 % | HEIGHT: 66 IN | RESPIRATION RATE: 17 BRPM | DIASTOLIC BLOOD PRESSURE: 70 MMHG | HEART RATE: 61 BPM | TEMPERATURE: 97 F | BODY MASS INDEX: 20.09 KG/M2

## 2025-02-24 DIAGNOSIS — R13.19 ESOPHAGEAL DYSPHAGIA: ICD-10-CM

## 2025-02-24 DIAGNOSIS — K44.9 HIATAL HERNIA: ICD-10-CM

## 2025-02-24 DIAGNOSIS — K22.89 ESOPHAGEAL THICKENING: ICD-10-CM

## 2025-02-24 PROCEDURE — 43235 EGD DIAGNOSTIC BRUSH WASH: CPT | Performed by: INTERNAL MEDICINE

## 2025-02-24 RX ORDER — PROPOFOL 10 MG/ML
INJECTION, EMULSION INTRAVENOUS AS NEEDED
Status: DISCONTINUED | OUTPATIENT
Start: 2025-02-24 | End: 2025-02-24

## 2025-02-24 RX ORDER — LIDOCAINE HYDROCHLORIDE 10 MG/ML
INJECTION, SOLUTION EPIDURAL; INFILTRATION; INTRACAUDAL; PERINEURAL AS NEEDED
Status: DISCONTINUED | OUTPATIENT
Start: 2025-02-24 | End: 2025-02-24

## 2025-02-24 RX ORDER — SODIUM CHLORIDE, SODIUM LACTATE, POTASSIUM CHLORIDE, CALCIUM CHLORIDE 600; 310; 30; 20 MG/100ML; MG/100ML; MG/100ML; MG/100ML
INJECTION, SOLUTION INTRAVENOUS CONTINUOUS PRN
Status: DISCONTINUED | OUTPATIENT
Start: 2025-02-24 | End: 2025-02-24

## 2025-02-24 RX ADMIN — LIDOCAINE HYDROCHLORIDE 100 MG: 10 SOLUTION INTRAVENOUS at 07:39

## 2025-02-24 RX ADMIN — PROPOFOL 50 MG: 10 INJECTION, EMULSION INTRAVENOUS at 07:42

## 2025-02-24 RX ADMIN — SODIUM CHLORIDE, SODIUM LACTATE, POTASSIUM CHLORIDE, AND CALCIUM CHLORIDE: .6; .31; .03; .02 INJECTION, SOLUTION INTRAVENOUS at 07:25

## 2025-02-24 RX ADMIN — PROPOFOL 100 MG: 10 INJECTION, EMULSION INTRAVENOUS at 07:39

## 2025-02-24 NOTE — ANESTHESIA POSTPROCEDURE EVALUATION
Post-Op Assessment Note    CV Status:  Stable  Pain Score: 0    Pain management: adequate       Mental Status:  Alert and awake   Hydration Status:  Euvolemic   PONV Controlled:  Controlled   Airway Patency:  Patent     Post Op Vitals Reviewed: Yes    No anethesia notable event occurred.    Staff: CRNA       Last Filed PACU Vitals:  Vitals Value Taken Time   Temp 97 °F (36.1 °C) 02/24/25 0750   Pulse 72 02/24/25 0750   /63 02/24/25 0750   Resp 17 02/24/25 0750   SpO2 100 % 02/24/25 0750       Modified Fatoumata:     Vitals Value Taken Time   Activity 2 02/24/25 0751   Respiration 2 02/24/25 0751   Circulation 2 02/24/25 0751   Consciousness 2 02/24/25 0751   Oxygen Saturation 2 02/24/25 0751     Modified Fatoumata Score: 10

## 2025-02-24 NOTE — ANESTHESIA PREPROCEDURE EVALUATION
Procedure:  EGD    Relevant Problems   CARDIO   (+) Elevated coronary artery calcium score   (+) Grade I hemorrhoids   (+) Mixed hyperlipidemia   (+) Nonrheumatic aortic valve insufficiency      GI/HEPATIC   (+) Hiatal hernia      /RENAL   (+) Renal cysts, acquired, bilateral      MUSCULOSKELETAL   (+) Hiatal hernia      Exercise stress echo negative for ischemia 1/23/2025    Physical Exam    Airway    Mallampati score: II  TM Distance: >3 FB  Neck ROM: full     Dental   No notable dental hx     Cardiovascular      Pulmonary      Other Findings  post-pubertal.      Anesthesia Plan  ASA Score- 2     Anesthesia Type- IV sedation with anesthesia with ASA Monitors.         Additional Monitors:     Airway Plan:            Plan Factors-Exercise tolerance (METS): >4 METS.    Chart reviewed.    Patient summary reviewed.    Patient is not a current smoker.      Obstructive sleep apnea risk education given perioperatively.        Induction- intravenous.    Postoperative Plan-     Perioperative Resuscitation Plan - Level 1 - Full Code.       Informed Consent- Anesthetic plan and risks discussed with patient.  I personally reviewed this patient with the CRNA. Discussed and agreed on the Anesthesia Plan with the CRNA..      NPO Status:  Vitals Value Taken Time   Date of last liquid 02/23/25 02/24/25 0656   Time of last liquid 1800 02/24/25 0656   Date of last solid 02/23/25 02/24/25 0656   Time of last solid 2000 02/24/25 0656

## 2025-03-11 NOTE — PROGRESS NOTES
Lower Endoscopy    Date/Time: 3/12/2025 3:40 PM    Performed by: STEPHANIE Grant MD  Authorized by: STEPHANIE Grant MD    Verbal consent obtained?: Yes    Consent given by:  Patient  Scope type:  Anoscope    Internal anoscopy is essentially normal.  The external hemorrhoids seem to be the patient's source of symptoms.  With manipulation of the left anterior, identifiable fold of external hemorrhoidal skin, the patient describes it as being the most significant source of her symptoms.  She also complains of other redundant tissues in the anal verge area for which she wants treatment.    Colon and Rectal Surgery   Debra River 54 y.o. female MRN [id number]  Encounter: [id number]  03/12/25 4:18 PM            Assessment: Debra River is a 54 y.o. female who has hemorrhoids.      Plan:   Grade I hemorrhoids  The patient has been here 3 times for complaints of anal itching and irritation.  I had felt that internal hemorrhoids may be causing her symptoms but this seems not likely based on today's examination.  The internal hemorrhoids are essentially normal.  Instead, I find mild folds of external hemorrhoidal skin with pleating of the skin.  This is relatively subtle but, especially at the left anterior position, there are areas of irritation and nodularity.  She specifically states that these of the sites that her symptoms emanate from.    After lengthy discussion, it seems clear that the external hemorrhoids are the source of her symptoms.  I had recommended against surgery at her last visit.  She prefers to have something done to try to eliminate the symptoms that are decades long.  I think this is reasonable.  She has enough quality of life impairment that she wants surgery and this is a reasonable consideration given the potential for relief of her symptoms.  She knows that the symptoms may persist after surgery, hemorrhoids can become heaped up at the suture lines, a fissure like wound healing problem can  occur, or pain may be a problem.  She understands.  She prefers to have an operation.    The goal here is to treat the external hemorrhoids, removing the left anterior redundant external hemorrhoid, specifically.  Additional hemorrhoidectomy will be done based on findings in the operating room. Risks, not limited to infection, bleeding, pain, persistent disease, need for future surgery, fecal incontinence, urinary retention, or nonhealing wounds were reviewed at length.  Questions were answered.        Subjective     HPI    Debra River is a 54 y.o. female who presents for follow up to hemorrhoids and anal skin irritation.     The patient notes status of her symptoms is the same.     Last seen in the office on 9/9/2024; at the time examination showed: Internal hemorrhoidal redundancy with external hemorrhoidal irritation and thickening. Avoiding wipe trauma, high fiber intake, Calmoseptine and Benadryl ointment were recommended and rubber band ligation was considered if symptoms failed to improved.     Last colonoscopy on 9/7/2022 which was within normal limits.  Recall colonoscopy 10 years.         Historical Information   Past Medical History:   Diagnosis Date    Cardiac murmur     COVID 01/2022    early January    Kidney stone     Known health problems: none     Low blood pressure     Patellofemoral pain syndrome of left knee 04/30/2021    PONV (postoperative nausea and vomiting)     Pt requests to be pre-medicated for severe N/V    Shingles     Stress fracture of right fibula     Viral meningitis      Past Surgical History:   Procedure Laterality Date    BREAST BIOPSY      in the past 8 years not sure which breast /patient    HAND SURGERY Left     plate    INCISIONAL BREAST BIOPSY  2016    MA HYSTEROSCOPY BX ENDOMETRIUM&/POLYPC W/WO D&C N/A 11/19/2020    Procedure: D&C, HYSTEROSCOPY;  Surgeon: Mary Ellen Ham DO;  Location: [facility];  Service: Gynecology    MA HYSTEROSCOPY ENDOMETRIAL ABLATION N/A  "11/19/2020    Procedure: ABLATION;  Surgeon: Mary Ellen Ham DO;  Location: [facility];  Service: Gynecology    IN RPR UMBILICAL HRNA 5 YRS/> REDUCIBLE N/A 03/29/2022    Procedure: REPAIR HERNIA UMBILICAL;  Surgeon: Jordan Hernandez MD;  Location: [facility];  Service: General    TONSILLECTOMY      TONSILLECTOMY      VENTRAL HERNIA REPAIR N/A 03/29/2022    Procedure: REPAIR HERNIA VENTRAL;  Surgeon: Jordan Hernandez MD;  Location: [facility];  Service: General    WISDOM TOOTH EXTRACTION         Meds/Allergies       Current Outpatient Medications:     magnesium gluconate (MAGONATE) 500 mg tablet, Take 1 tablet (500 mg total) by mouth 2 (two) times a day, Disp: , Rfl:     rosuvastatin (CRESTOR) 5 mg tablet, Take 1 tablet (5 mg total) by mouth daily, Disp: 100 tablet, Rfl: 3    valACYclovir (VALTREX) 500 mg tablet, Take 500 mg by mouth daily at bedtime (Patient taking differently: Take 500 mg by mouth if needed), Disp: , Rfl:     Cholecalciferol (Vitamin D3) 50 MCG (2000 UT) capsule, Take 1 capsule (2,000 Units total) by mouth daily (Patient not taking: Reported on 3/12/2025), Disp: , Rfl:   No Known Allergies    Social History   Social History     Substance and Sexual Activity   Drug Use Never    Comment: No use     Social History     Tobacco Use   Smoking Status Never   Smokeless Tobacco Never         Family History   Problem Relation Age of Onset    Arrhythmia Mother     Hypertension Mother     Completed Suicide  Father     Glaucoma Father     Gout Brother     Completed Suicide  Brother     No Known Problems Son     No Known Problems Daughter     No Known Problems Daughter     No Known Problems Maternal Grandmother     No Known Problems Maternal Grandfather     No Known Problems Paternal Grandmother     Breast cancer Other         pt. does not know         Review of Systems    Objective   Current Vitals:  Vitals:    03/12/25 1544   Height: 5' 6\" (1.676 m)         Physical Exam  Constitutional:       Appearance: Normal " appearance.   Cardiovascular:      Rate and Rhythm: Normal rate and regular rhythm.   Pulmonary:      Effort: Pulmonary effort is normal.      Breath sounds: Normal breath sounds.   Genitourinary:     Comments: On anoscopy, the internal hemorrhoids are essentially normal.  External hemorrhoidal bleeding and nodularity are present, especially at the left anterior position.  This area is tender on manipulation and is somewhat nodular and raised.  The circumference of external hemorrhoids are similar and may need surgical therapy at the time of hemorrhoidectomy.  Neurological:      Mental Status: She is alert.

## 2025-03-12 ENCOUNTER — OFFICE VISIT (OUTPATIENT)
Age: 55
End: 2025-03-12
Payer: COMMERCIAL

## 2025-03-12 VITALS — BODY MASS INDEX: 20.18 KG/M2 | HEIGHT: 66 IN

## 2025-03-12 DIAGNOSIS — K64.0 GRADE I HEMORRHOIDS: Primary | ICD-10-CM

## 2025-03-12 PROCEDURE — 99213 OFFICE O/P EST LOW 20 MIN: CPT | Performed by: COLON & RECTAL SURGERY

## 2025-03-12 PROCEDURE — 46600 DIAGNOSTIC ANOSCOPY SPX: CPT | Performed by: COLON & RECTAL SURGERY

## 2025-03-12 NOTE — ASSESSMENT & PLAN NOTE
The patient has been here 3 times for complaints of anal itching and irritation.  I had felt that internal hemorrhoids may be causing her symptoms but this seems not likely based on today's examination.  The internal hemorrhoids are essentially normal.  Instead, I find mild folds of external hemorrhoidal skin with pleating of the skin.  This is relatively subtle but, especially at the left anterior position, there are areas of irritation and nodularity.  She specifically states that these of the sites that her symptoms emanate from.    After lengthy discussion, it seems clear that the external hemorrhoids are the source of her symptoms.  I had recommended against surgery at her last visit.  She prefers to have something done to try to eliminate the symptoms that are decades long.  I think this is reasonable.  She has enough quality of life impairment that she wants surgery and this is a reasonable consideration given the potential for relief of her symptoms.  She knows that the symptoms may persist after surgery, hemorrhoids can become heaped up at the suture lines, a fissure like wound healing problem can occur, or pain may be a problem.  She understands.  She prefers to have an operation.    The goal here is to treat the external hemorrhoids, removing the left anterior redundant external hemorrhoid, specifically.  Additional hemorrhoidectomy will be done based on findings in the operating room. Risks, not limited to infection, bleeding, pain, persistent disease, need for future surgery, fecal incontinence, urinary retention, or nonhealing wounds were reviewed at length.  Questions were answered.

## 2025-03-13 ENCOUNTER — TELEPHONE (OUTPATIENT)
Age: 55
End: 2025-03-13

## 2025-03-14 ENCOUNTER — RESULTS FOLLOW-UP (OUTPATIENT)
Age: 55
End: 2025-03-14

## 2025-03-17 ENCOUNTER — TELEPHONE (OUTPATIENT)
Age: 55
End: 2025-03-17

## 2025-03-18 NOTE — TELEPHONE ENCOUNTER
2nd attempt at calling patient to schedule surgery, lvm for return call. Medication ok'd by MD, prescription will be called into pharmacy.

## 2025-03-18 NOTE — TELEPHONE ENCOUNTER
Called  [facility] and left a voicemail for hydrocortisone rectal cream 2.5% sig: apply topically up to 4x daily PRN.

## 2025-03-28 DIAGNOSIS — R93.1 ELEVATED CORONARY ARTERY CALCIUM SCORE: ICD-10-CM

## 2025-03-28 DIAGNOSIS — E78.2 MIXED HYPERLIPIDEMIA: ICD-10-CM

## 2025-03-28 RX ORDER — ROSUVASTATIN CALCIUM 5 MG/1
5 TABLET, COATED ORAL DAILY
Qty: 100 TABLET | Refills: 3 | Status: SHIPPED | OUTPATIENT
Start: 2025-03-28

## 2025-04-11 DIAGNOSIS — R93.1 ELEVATED CORONARY ARTERY CALCIUM SCORE: ICD-10-CM

## 2025-04-11 DIAGNOSIS — E78.2 MIXED HYPERLIPIDEMIA: ICD-10-CM

## 2025-04-11 RX ORDER — ROSUVASTATIN CALCIUM 5 MG/1
5 TABLET, COATED ORAL DAILY
Qty: 30 TABLET | Refills: 12 | Status: SHIPPED | OUTPATIENT
Start: 2025-04-11

## 2025-07-21 PROCEDURE — 99284 EMERGENCY DEPT VISIT MOD MDM: CPT

## 2025-07-22 ENCOUNTER — OFFICE VISIT (OUTPATIENT)
Dept: OBGYN CLINIC | Facility: CLINIC | Age: 55
End: 2025-07-22
Payer: COMMERCIAL

## 2025-07-22 ENCOUNTER — HOSPITAL ENCOUNTER (EMERGENCY)
Facility: HOSPITAL | Age: 55
Discharge: HOME | End: 2025-07-22
Attending: EMERGENCY MEDICINE
Payer: COMMERCIAL

## 2025-07-22 ENCOUNTER — APPOINTMENT (EMERGENCY)
Dept: RADIOLOGY | Facility: HOSPITAL | Age: 55
End: 2025-07-22
Payer: COMMERCIAL

## 2025-07-22 VITALS — WEIGHT: 125 LBS | BODY MASS INDEX: 20.09 KG/M2 | HEIGHT: 66 IN

## 2025-07-22 VITALS
RESPIRATION RATE: 18 BRPM | DIASTOLIC BLOOD PRESSURE: 63 MMHG | SYSTOLIC BLOOD PRESSURE: 142 MMHG | HEART RATE: 65 BPM | TEMPERATURE: 98.5 F | OXYGEN SATURATION: 97 %

## 2025-07-22 DIAGNOSIS — M79.672 ACUTE PAIN OF LEFT FOOT: Primary | ICD-10-CM

## 2025-07-22 DIAGNOSIS — M79.672 ACUTE PAIN OF LEFT FOOT: ICD-10-CM

## 2025-07-22 DIAGNOSIS — S92.352A DISPLACED FRACTURE OF FIFTH METATARSAL BONE, LEFT FOOT, INITIAL ENCOUNTER FOR CLOSED FRACTURE: ICD-10-CM

## 2025-07-22 DIAGNOSIS — S92.352A DISPLACED FRACTURE OF FIFTH METATARSAL BONE, LEFT FOOT, INITIAL ENCOUNTER FOR CLOSED FRACTURE: Primary | ICD-10-CM

## 2025-07-22 PROCEDURE — 73630 X-RAY EXAM OF FOOT: CPT

## 2025-07-22 PROCEDURE — 99204 OFFICE O/P NEW MOD 45 MIN: CPT | Performed by: ORTHOPAEDIC SURGERY

## 2025-07-22 RX ORDER — ACETAMINOPHEN 325 MG/1
975 TABLET ORAL ONCE
Status: DISCONTINUED | OUTPATIENT
Start: 2025-07-22 | End: 2025-07-22 | Stop reason: HOSPADM

## 2025-07-22 NOTE — PROGRESS NOTES
Patient is requesting a referral to a hormonal specialist.  I will give her the name of Dr. Anjel Mendes.  She also is requesting blood work which has already been ordered.

## 2025-07-22 NOTE — ED PROVIDER NOTES
Time reflects when diagnosis was documented in both MDM as applicable and the Disposition within this note       Time User Action Codes Description Comment    7/22/2025 12:27 AM Miki Brannon Add [M79.672] Acute pain of left foot     7/22/2025 12:37 AM Miki Brannon Add [S92.352A] Displaced fracture of fifth metatarsal bone, left foot, initial encounter for closed fracture           ED Disposition       ED Disposition   Discharge    Condition   Stable    Date/Time   Tue Jul 22, 2025 12:36 AM    Comment   Debra River discharge to home/self care.                   Assessment & Plan       Medical Decision Making  Debra River is a 55 y.o. female with a past medical history of hyperlipidemia being evaluated for fall with injury to left foot    Differential diagnoses include but not limited to: Fracture, dislocation, sprain/strain    Initial workup to include: X-ray of left foot, Tylenol for pain relief    See ED Course for data/imaging interpretation and further MDM.    Disposition: X-ray with evidence of displaced fifth metatarsal fracture at midshaft.  Patient placed in cam boot without complication and given referral for orthopedic surgery follow-up with Dr. Lachman.  Return precaution discussed, discharged home in stable condition.      Amount and/or Complexity of Data Reviewed  Radiology:  Decision-making details documented in ED Course.    Risk  OTC drugs.        ED Course as of 07/22/25 0112   Tue Jul 22, 2025   0037 XR foot 3+ views LEFT  Fracture to midshaft of fifth metatarsal per my interpretation       Medications   acetaminophen (TYLENOL) tablet 975 mg (975 mg Oral Not Given 7/22/25 0103)       ED Risk Strat Scores                    No data recorded                            History of Present Illness       Chief Complaint   Patient presents with    Fall    Foot Pain     Missed about three steps, injured left foot/ankle. 800 mg ibuprofen taken 1 hour prior to arrival        Past Medical History[1]    Past Surgical History[2]   Family History[3]   Social History[4]   E-Cigarette/Vaping    E-Cigarette Use Never User       E-Cigarette/Vaping Substances    Nicotine No     THC No     CBD No     Flavoring No     Other No     Unknown No       I have reviewed and agree with the history as documented.     Debra River is a 55 y.o. female with a past medical history of hyperlipidemia being evaluated for fall with injury to left foot.  Patient states that she had misstep and fell approximately 3 steps.  Significant pain with ambulation, pain overlying lateral aspect of left foot.  She denies any current ankle pain.  No prior history of fractures, dislocation, surgeries to left lower extremity.  Denies any additional injuries.  No blood thinners or daily aspirin.  No head strike.            Fall      Review of Systems   Musculoskeletal:         Left foot pain   All other systems reviewed and are negative.          Objective       ED Triage Vitals [07/22/25 0011]   Temperature Pulse Blood Pressure Respirations SpO2 Patient Position - Orthostatic VS   98.5 °F (36.9 °C) 66 141/78 18 98 % Sitting      Temp src Heart Rate Source BP Location FiO2 (%) Pain Score    -- Monitor Right arm -- --      Vitals      Date and Time Temp Pulse SpO2 Resp BP Pain Score FACES Pain Rating User   07/22/25 0030 -- 65 97 % 18 142/63 -- -- CG   07/22/25 0011 98.5 °F (36.9 °C) 66 98 % 18 141/78 -- -- DB            Physical Exam  Vitals and nursing note reviewed.   Constitutional:       General: She is not in acute distress.     Appearance: She is well-developed.   HENT:      Head: Normocephalic and atraumatic.     Eyes:      Conjunctiva/sclera: Conjunctivae normal.       Cardiovascular:      Rate and Rhythm: Normal rate and regular rhythm.      Pulses: Normal pulses.      Heart sounds: Normal heart sounds. No murmur heard.  Pulmonary:      Effort: Pulmonary effort is normal. No respiratory distress.      Breath sounds: Normal breath sounds.    Abdominal:      Tenderness: There is no abdominal tenderness.     Musculoskeletal:         General: No swelling.      Cervical back: Neck supple.      Right foot: Normal.      Left foot: Bony tenderness present.      Comments: Neurovascularly intact, able to flex and extend foot against resistance.  Bony tenderness overlying lateral aspect of left foot in the region of 4th and 5th metatarsal.  No bony tenderness to medial or lateral malleolus of ankle     Skin:     General: Skin is warm and dry.      Capillary Refill: Capillary refill takes less than 2 seconds.     Neurological:      Mental Status: She is alert.     Psychiatric:         Mood and Affect: Mood normal.         Results Reviewed       None            XR foot 3+ views LEFT    (Results Pending)       Splint application    Date/Time: 7/22/2025 1:03 AM    Performed by: Jimmie Holman DO  Authorized by: Jimmie Holman DO    Patient identity confirmed:  Verbally with patient and arm band  Pre-procedure details:     Sensation:  Normal  Procedure details:     Laterality:  Left    Location:  Foot    Foot:  L foot    Cast type: Cam boot.    Splint composition: static    Post-procedure details:     Pain:  Unchanged    Sensation:  Normal    Patient tolerance of procedure:  Tolerated well, no immediate complications      ED Medication and Procedure Management   Prior to Admission Medications   Prescriptions Last Dose Informant Patient Reported? Taking?   Cholecalciferol (Vitamin D3) 50 MCG (2000 UT) capsule   No No   Sig: Take 1 capsule (2,000 Units total) by mouth daily   Patient not taking: Reported on 3/12/2025   magnesium gluconate (MAGONATE) 500 mg tablet   No No   Sig: Take 1 tablet (500 mg total) by mouth 2 (two) times a day   rosuvastatin (CRESTOR) 5 mg tablet   No No   Sig: TAKE 1 TABLET (5 MG) BY MOUTH DAILY   valACYclovir (VALTREX) 500 mg tablet   Yes No   Sig: Take 500 mg by mouth daily at bedtime   Patient taking differently: Take 500 mg by mouth if needed       Facility-Administered Medications: None     Patient's Medications   Discharge Prescriptions    No medications on file       ED SEPSIS DOCUMENTATION   Time reflects when diagnosis was documented in both MDM as applicable and the Disposition within this note       Time User Action Codes Description Comment    7/22/2025 12:27 AM Miki Brannon [M79.672] Acute pain of left foot     7/22/2025 12:37 AM Miki Brannon [S92.352A] Displaced fracture of fifth metatarsal bone, left foot, initial encounter for closed fracture                      [1]   Past Medical History:  Diagnosis Date    Cardiac murmur     COVID 01/2022    early January    Kidney stone     Known health problems: none     Low blood pressure     Patellofemoral pain syndrome of left knee 04/30/2021    PONV (postoperative nausea and vomiting)     Pt requests to be pre-medicated for severe N/V    Shingles     Stress fracture of right fibula     Viral meningitis    [2]   Past Surgical History:  Procedure Laterality Date    BREAST BIOPSY      in the past 8 years not sure which breast /patient    HAND SURGERY Left     plate    INCISIONAL BREAST BIOPSY  2016    CT HYSTEROSCOPY BX ENDOMETRIUM&/POLYPC W/WO D&C N/A 11/19/2020    Procedure: D&C, HYSTEROSCOPY;  Surgeon: Mary Ellen Ham DO;  Location: [facility];  Service: Gynecology    CT HYSTEROSCOPY ENDOMETRIAL ABLATION N/A 11/19/2020    Procedure: ABLATION;  Surgeon: Mary Ellen Ham DO;  Location: [facility];  Service: Gynecology    CT RPR UMBILICAL HRNA 5 YRS/> REDUCIBLE N/A 03/29/2022    Procedure: REPAIR HERNIA UMBILICAL;  Surgeon: Jordan Hernandez MD;  Location: [facility];  Service: General    TONSILLECTOMY      TONSILLECTOMY      VENTRAL HERNIA REPAIR N/A 03/29/2022    Procedure: REPAIR HERNIA VENTRAL;  Surgeon: Jordan Hernandez MD;  Location: [facility];  Service: General    WISDOM TOOTH EXTRACTION     [3]   Family History  Problem Relation Name Age of Onset    Arrhythmia Mother      Hypertension Mother       Completed Suicide  Father Dustin Cash     Glaucoma Father Dustin Cash     Gout Brother      Completed Suicide  Brother      No Known Problems Son      No Known Problems Daughter cierra     No Known Problems Daughter ruben     No Known Problems Maternal Grandmother      No Known Problems Maternal Grandfather      No Known Problems Paternal Grandmother      Breast cancer Other maternal Great Aunt         pt. does not know   [4]   Social History  Tobacco Use    Smoking status: Never    Smokeless tobacco: Never   Vaping Use    Vaping status: Never Used   Substance Use Topics    Alcohol use: Yes     Comment: Rarely    Drug use: Never     Comment: No use        Jimmie Holman DO  07/22/25 0112

## 2025-07-22 NOTE — PATIENT INSTRUCTIONS
Weightbearing as tolerated in boot  Do not need to wear the boot for sleep or showering but should wear it any time you are walking on it.    Recommend taking the following supplements: Vitamin D3 4000 units per day and Calcium 1200 mg per day. This will help with bone healing.     Avoid NSAIDs like Motrin/Aleve/Ibuprofen.  Avoid steroids/nicotine products/ rheumatoid medications like DMARDs if possible.    Aspirin 81mg daily for blood clot prevention.    Knee high compression stocking 20/30mm HG of pressure

## 2025-07-22 NOTE — ASSESSMENT & PLAN NOTE
WBAT LLE in postop shoe  Recommend vit D3 and calcium supplementation  Asa 81mg bid for dvt ppx  Follow up in 6 weeks with new weightbearing left foot xrays at that visit    Orders:    Ambulatory Referral to Orthopedic Surgery    Post Op Shoe

## 2025-07-22 NOTE — DISCHARGE INSTRUCTIONS
"Patient Education     Managing acute pain at home   The Basics   Written by the doctors and editors at [facility]   What is acute pain? -- This means pain that lasts for a short period of time. For example, it can happen after an injury, surgery, or medical or dental procedure. It can be mild, moderate, or severe.  Acute pain is different from chronic pain. \"Chronic\" means pain that lasts longer than a few months. It can be related to many different conditions, like arthritis, back pain, or nerve pain from diabetes. Chronic pain is managed differently.  How is acute pain treated? -- The goal of treatment is to get your pain to a manageable level. It is not always possible to make pain go away completely. Your doctor will work with you to make a plan for treating your pain at home. This will depend on the cause of your pain and how severe it is, as well as your individual situation.  There are different ways to manage pain. Doctors often use more than 1 of these at a time. They include:   Non-medicine techniques - For example, it might help to put ice or heat on the painful part of your body. Resting and keeping the painful part elevated can also help. Some people use relaxation exercises or meditation to help them manage pain.   Non-opioid pain medicine - These include:   NSAIDs such as ibuprofen (sample brand names: Advil, Motrin) or naproxen (sample brand name: Aleve)   Acetaminophen, also known as paracetamol (sample brand name: Tylenol)  Doctors often recommend taking both an NSAID and acetaminophen on a regular schedule. This can help better control pain. Talk to your doctor about what medicines you should take and how much. People with some medical conditions should not take NSAIDs.   Local anesthetics - These are medicines that numb the painful part of the body. A \"nerve block\" is when the medicine is injected near certain nerves, which reduces pain in the area. Sometimes, the medicine is given as just 1 shot. " "Other times, it is given continuously through a small tube called a \"catheter.\" The catheter attaches to a pump that can continue to give medicine after going home from the hospital.   Opioids - Opioids are a group of prescription medicines that relieve pain. They are sometimes used when other types of pain medicine do not help enough. But they come with risks. For this reason, doctors often try other ways of treating pain first.  What should I know about opioids? -- If your doctor prescribes opioids for your pain, there are some things that are important to know:   Opioids have side effects. For example, if you take too much of an opioid, it can cause serious problems like not breathing enough.   For treating acute pain, opioids are often prescribed as \"immediate-release\" pills. These work quickly. They are also available as liquids, suppositories, and shots for people who cannot swallow pills. The table shows different opioids used to treat acute pain (table 1).   Doctors usually prescribe the lowest dose possible, for the shortest amount of time possible. This usually means a few days or a week.   If your doctor prescribes an opioid, they will usually tell you to take an NSAID or acetaminophen as well. This is to help keep your pain under control so you can use less of the opioid. Some opioids come combined with acetaminophen or an NSAID in the same pill. If your medicine has both, do not take any extra NSAIDs or acetaminophen without talking to your doctor first.   If you are pregnant, talk with your doctor about your options for treating your pain. There are risks to taking opioids during pregnancy, especially if it is for more than a few days.   In some cases, taking opioids can lead to misuse or opioid use disorder:   \"Opioid misuse\" means taking an opioid in a way that is different than how your doctor prescribed. An example of misuse is taking the opioid when you do not need it for pain. If you take too " "much at once, or take opioids with alcohol or certain other drugs, it can cause serious harm or even death from overdose.   \"Opioid use disorder\" is the medical term for when a person can't control their use of the opioid. A person with opioid use disorder might use more medicine than they planned to. They might need higher doses to get the same effect that they used to get with fewer pills or a lower dose. Or they might want to stop or use opioids less often, but not be able to.   Opioids come with side effects. Some are just bothersome, and some can be dangerous. Information about what side effects to watch for, and when to call the doctor, is below.  There are things you can do to stay safe if you need to take an opioid medicine. These things help protect yourself and others:   Follow your treatment plan carefully. Take only the amount of medicine that your doctor prescribes, and only as often as they tell you to. Different people need different doses. Never take opioids that were not prescribed to you. Talk to your doctor or nurse if you think that your opioids are not helping enough with your pain.   Do not drink alcohol while you are taking opioids.   Do not take opioids with medicines that make you sleepy, unless your doctor tells you to. Examples include \"benzodiazepines\" like diazepam (sample brand name: Valium) or alprazolam (sample brand name: Xanax), muscle relaxants like baclofen or cyclobenzaprine, or sleeping pills like zolpidem (sample brand name: Ambien).   Do not drive a car, use dangerous machinery, or do other risky activities while taking an opioid medicine. Opioids can make you feel tired or have trouble thinking clearly.   Store your opioids in a safe place, such as a locked cabinet. This will prevent children, teens, or anyone else from getting to them.   Follow your doctor's instructions about how to stop taking your opioid once your pain has improved. This usually involves reducing the dose " "gradually. This is called \"tapering.\"   When your pain gets better, get rid of any leftover medicines. Your doctor, nurse, or pharmacist can suggest ways to get rid of them. This might involve flushing them down the toilet or mixing them with something like dirt or cat litter, then putting the mixture in a sealed container in the trash. Some police stations and pharmacies also take leftover medicines.   Try to get all of your pain medicines from the same doctor. If that is not possible, make sure that all of your doctors know every medicine you take, even those that are non-prescription. Bring a complete list of all of your pain medicines and other medicines with you whenever you go to a doctor, nurse, dentist, or pharmacist. Ask your doctor or pharmacist if it is safe to take your other medicines with your pain medicines.  When should I call for help? -- If you are taking an opioid, it's important to be aware of possible side effects and when to get help.  Call for an ambulance (in the [place] and [place], call 9-1-1) if you took too much of an opioid medicine or think that someone is having a drug overdose. Signs of an opioid overdose include:   Extreme sleepiness   Slow breathing, or no breathing at all   Very small pupils (the black circles in the center of the eyes)   Very slow heartbeat  An opioid overdose can be treated with a medicine called \"naloxone.\" This can save the person's life. But it needs to be given as soon as possible.  Call your doctor or nurse if you have side effects that bother you, such as:   Constipation - Your doctor or nurse might suggest that you take a laxative to prevent or treat constipation. If your bowel movements are hard and dry, a stool softener might help. Drink plenty of water.   Mild nausea or stomach discomfort - Taking the medicine with or after food can help with this.   Severe nausea, vomiting, or itchiness - If you have any of these problems, your doctor might be able to switch " "you to a different medicine.   Dry mouth   Feeling dizzy or sleepy, or having trouble thinking clearly   Vision problems   Being clumsy or falling down  If you suddenly stop taking your opioid after taking it on a regular schedule for several days, you might have unpleasant symptoms, called \"withdrawal.\" These can include a stomach ache, diarrhea, or shakes. Doctors usually recommend reducing your dose gradually to help avoid withdrawal.  Tell your doctor or nurse if you are having trouble managing your pain or have questions about how to take your medicine.  How can I learn more about my medicine? -- For more detailed information about your medicines, ask your doctor or nurse for the patient drug information handout from [employer]. It explains how to use each medicine, describes its possible side effects, and lists other medicines or foods that can affect how it works.  All topics are updated as new evidence becomes available and our peer review process is complete.  This topic retrieved from [employer] on: Apr 13, 2024.  Topic 74601 Version 32.0  Release: 32.3.2 - C32.102  © 2024 Up[employer]Date, Inc. and/or its affiliates. All rights reserved.  table 1: Opioid medicines commonly used to treat acute pain  Medicine name  Forms    Oxycodone (brand names: Oxaydo, Roxicodone)  Oxycodone with acetaminophen (brand names: Endocet, Nalocet, Percocet, Prolate) Pills  Liquids   Hydrocodone  Hydrocodone with acetaminophen (brand name: Lortab)  Hydrocodone with ibuprofen Pills  Liquids   Hydromorphone (brand name: Dilaudid) Pills  Liquids  Shots  Suppositories (small amounts of medicine that go into the rectum)   Morphine Pills  Liquids  Shots  Suppositories (small amounts of medicine that go into the rectum)   Tramadol (brand name: Qdolo)  Tramadol with acetaminophen Pills   Codeine  Codeine with acetaminophen Pills  Liquids   These are some of the opioid medicines doctors prescribe to treat acute pain. \"Acute\" means pain that lasts a " "short period of time, such as after surgery or an injury. There are other opioids, too, as well as other forms. But those are used more often for treating pain in people with long-term or \"chronic\" pain.  All of the medicines in this list come as \"generics.\" Sample [place] brand names are also listed when available.  Most of the time, doctors prescribe \"immediate-release\" opioid pills for acute pain. These work fast to relieve pain. Some opioids also come as \"extended-release\" pills. These release medicine into the body more slowly over time. Extended-release pills are not usually prescribed to treat acute pain.  For more detailed information, ask your doctor or nurse for the patient drug information handout from [employer]. It explains how to use each medicine, describes its possible side effects, and lists other medicines or foods that can affect how it works.  Graphic 823476 Version 4.0  Consumer Information Use and Disclaimer   Disclaimer: This generalized information is a limited summary of diagnosis, treatment, and/or medication information. It is not meant to be comprehensive and should be used as a tool to help the user understand and/or assess potential diagnostic and treatment options. It does NOT include all information about conditions, treatments, medications, side effects, or risks that may apply to a specific patient. It is not intended to be medical advice or a substitute for the medical advice, diagnosis, or treatment of a health care provider based on the health care provider's examination and assessment of a patient's specific and unique circumstances. Patients must speak with a health care provider for complete information about their health, medical questions, and treatment options, including any risks or benefits regarding use of medications. This information does not endorse any treatments or medications as safe, effective, or approved for treating a specific patient. Up[employer]Date, Inc. and its " affiliates disclaim any warranty or liability relating to this information or the use thereof.The use of this information is governed by the Terms of Use, available at https://www.wolterskluwer.com/en/know/clinical-effectiveness-terms. 2024© [employer] and its affiliates and/or licensors. All rights reserved.  Copyright   © 2024 [employer] and/or its affiliates. All rights reserved.

## 2025-07-22 NOTE — ED ATTENDING ATTESTATION
7/21/2025  IMiki MD, saw and evaluated the patient. I have discussed the patient with the resident/non-physician practitioner and agree with the resident's/non-physician practitioner's findings, Plan of Care, and MDM as documented in the resident's/non-physician practitioner's note, except where noted. All available labs and Radiology studies were reviewed.  I was present for key portions of any procedure(s) performed by the resident/non-physician practitioner and I was immediately available to provide assistance.       At this point I agree with the current assessment done in the Emergency Department.  I have conducted an independent evaluation of this patient a history and physical is as follows:    This is a 55-year-old female presenting to the ED today for complaint of left foot pain.  Patient slipped off of 3 steps, and twisted her left foot.  Patient states that she has tenderness to palpation overlying the base of the fifth metatarsal as well as the fourth metatarsal.  She has not been able to bear weight.  She took 800 mg of ibuprofen prior to arrival.  She did not hit her head, did not lose consciousness, does not complain of any pain anywhere else in her body.  On exam she does have significant tenderness of the midshaft of the fifth metatarsal.  Minimal tenderness of the midshaft fourth metatarsal.  3rd, 2nd and 1st metatarsals unremarkable, nontender.  No significant swelling noted.  No tenderness to palpation of the medial or lateral malleoli.  No laxity in the Achilles tendon.  No tenderness to palpation of the calcaneus.  Her differential diagnosis includes: Fracture versus dislocation versus contusion versus sprain versus other.  Patient not a good candidate for Toradol, considering the fact that she took 800 mg of ibuprofen prior to arrival.  Offered opiate pain medication, however she would like to defer at this time.  Ordered Tylenol.  X-rays performed, and do show midshaft fifth  "metatarsal fracture.  Boot ordered.  Patient will go home with crutches.  She will follow-up with Ortho as an outpatient.  The management plan was discussed in detail with the patient at bedside and all questions were answered. Strict ED return instructions were discussed at bedside. Prior to discharge, both verbal and written instructions were provided. We discussed the signs and symptoms that should prompt the patient to return to the ED. All questions were answered and the patient was comfortable with the plan of care and discharged home. The patient agrees to return to the Emergency Department for concerns and/or progression of illness.    Portions of the above record have been created with voice recognition software.  Occasional wrong word or \"sound alike\" substitutions may have occurred due to the inherent limitations of voice recognition software.  Read the chart carefully and recognize, using context, where substitutions may have occurred.    ED Course         Critical Care Time  Procedures      "

## (undated) DEVICE — STERILE 8 INCH PROCTO SWAB: Brand: CARDINAL HEALTH

## (undated) DEVICE — SCD SEQUENTIAL COMPRESSION COMFORT SLEEVE MEDIUM KNEE LENGTH: Brand: KENDALL SCD

## (undated) DEVICE — 3M™ STERI-STRIP™ REINFORCED ADHESIVE SKIN CLOSURES, R1547, 1/2 IN X 4 IN (12 MM X 100 MM), 6 STRIPS/ENVELOPE: Brand: 3M™ STERI-STRIP™

## (undated) DEVICE — 2000CC GUARDIAN II: Brand: GUARDIAN

## (undated) DEVICE — PVC URETHRAL CATHETER: Brand: DOVER

## (undated) DEVICE — BETHLEHEM UNIVERSAL MINOR GEN: Brand: CARDINAL HEALTH

## (undated) DEVICE — GLOVE PI ULTRA TOUCH SZ.7.0

## (undated) DEVICE — SUT MONOCRYL 4-0 PS-2 18 IN Y496G

## (undated) DEVICE — PLUMEPEN PRO 10FT

## (undated) DEVICE — GLOVE INDICATOR PI UNDERGLOVE SZ 8 BLUE

## (undated) DEVICE — GLOVE INDICATOR PI UNDERGLOVE SZ 7 BLUE

## (undated) DEVICE — PLASTIC ADHESIVE BANDAGE: Brand: CURITY

## (undated) DEVICE — CHLORAPREP HI-LITE 26ML ORANGE

## (undated) DEVICE — CYSTO TUBING SINGLE IRRIGATION

## (undated) DEVICE — NEEDLE 25G X 1 1/2

## (undated) DEVICE — SUT VICRYL 2-0 REEL 54 IN J286G

## (undated) DEVICE — 3000CC GUARDIAN II: Brand: GUARDIAN

## (undated) DEVICE — STERILE MINERVA DISPOSABLE HANDPIECECONTENTS:(1) ONE SINGLE USE STERILE MINERVA ES DISPOSABLE HANDPIECE (1) ONE SINGLE USE STERILE SYRINGE(1) ONE SINGLE USE STERILE 8MM HEGAR DILATOR(1) ONE SINGLE USE NON-STERILE DESICCANT(1) ONE NON-STERILE HANDPIECE INSTRUCTIONS FOR USE(1)  ONE NON-STERILE DILATOR INSTRUCTIONS FOR USE: Brand: MINERVA SINGLE STERILE DISPOSABLE HANDPIECE

## (undated) DEVICE — ANTIBACTERIAL UNDYED BRAIDED (POLYGLACTIN 910), SYNTHETIC ABSORBABLE SUTURE: Brand: COATED VICRYL

## (undated) DEVICE — SUT PDS II 2-0 SH 27 IN Z317H

## (undated) DEVICE — IV EXTENSION TUBING 33 IN

## (undated) DEVICE — PREMIUM DRY TRAY LF: Brand: MEDLINE INDUSTRIES, INC.

## (undated) DEVICE — 3M™ STERI-STRIP™ COMPOUND BENZOIN TINCTURE 40 BAGS/CARTON 4 CARTONS/CASE C1544: Brand: 3M™ STERI-STRIP™

## (undated) DEVICE — GLOVE SRG BIOGEL 8

## (undated) DEVICE — STRL ALLENTOWN HYSTEROSCOPY PK: Brand: CARDINAL HEALTH

## (undated) DEVICE — INTENDED FOR TISSUE SEPARATION, AND OTHER PROCEDURES THAT REQUIRE A SHARP SURGICAL BLADE TO PUNCTURE OR CUT.: Brand: BARD-PARKER SAFETY BLADES SIZE 15, STERILE

## (undated) DEVICE — SUT VICRYL 3-0 SH 27 IN J416H

## (undated) DEVICE — ELECTRODE BLADE MOD E-Z CLEAN 2.5IN 6.4CM -0012M

## (undated) DEVICE — UNDER BUTTOCKS DRAPE W/FLUID CONTROL POUCH: Brand: CONVERTORS